# Patient Record
Sex: MALE | Race: WHITE | NOT HISPANIC OR LATINO | Employment: OTHER | URBAN - METROPOLITAN AREA
[De-identification: names, ages, dates, MRNs, and addresses within clinical notes are randomized per-mention and may not be internally consistent; named-entity substitution may affect disease eponyms.]

---

## 2020-07-09 ENCOUNTER — SOCIAL WORK (OUTPATIENT)
Dept: BEHAVIORAL/MENTAL HEALTH CLINIC | Facility: CLINIC | Age: 70
End: 2020-07-09
Payer: MEDICARE

## 2020-07-09 DIAGNOSIS — F33.1 MAJOR DEPRESSIVE DISORDER, RECURRENT EPISODE, MODERATE (HCC): Primary | Chronic | ICD-10-CM

## 2020-07-09 PROBLEM — F45.8 ANXIETY HYPERVENTILATION: Chronic | Status: ACTIVE | Noted: 2020-07-09

## 2020-07-09 PROBLEM — F41.9 ANXIETY HYPERVENTILATION: Chronic | Status: ACTIVE | Noted: 2020-07-09

## 2020-07-09 PROCEDURE — 90834 PSYTX W PT 45 MINUTES: CPT | Performed by: SOCIAL WORKER

## 2020-07-09 NOTE — BH TREATMENT PLAN
Almanoe Buchanan  1950       Date of Initial Treatment Plan: 7/9/20   Date of Current Treatment Plan: 07/09/20    Treatment Plan Number 1     Strengths/Personal Resources for Self Care: Good at his job, supportive family, and good health for his age     Diagnosis:   1  Major depressive disorder, recurrent episode, moderate (HCC)         Area of Needs: halfway fears, phase of life work       Long Term Goal 1: Feel good about his retiring years approaching     Target Date: 6 months   Completion Date:  Pending          Short Term Objectives for Goal 1: Will report playing golf more  Talk to wife ongoing about to retire well, Will report attending [employer] again and maybe attending some type of coffee / breakfast group  GOAL 1: Modality: Individual 4x per month   Completion Date 6 months       Behavioral Health Treatment Plan [facility]: Diagnosis and Treatment Plan explained to Jonathan Felder relates understanding diagnosis and is agreeable to Treatment Plan         Client Comments : Please share your thoughts, feelings, need and/or experiences regarding your treatment plan: Treatment Plan done but not signed at time of office visit due to:  Plan reviewed by phone or in person  and verbal consent given due to [place] social distancing

## 2020-07-09 NOTE — PSYCH
Progress Note     Subjective  Client reports he still has some floating anxieties on retiring  He discussed how he had been trying still to be more active but pulled a muscle setting him back and feeling disappointed with aging   He has been meeting goal to work on ideas with wife on how to retire but feels covid is slowing down progress as moving down [address] [place] is one idea but fearful over spikes         Objective   Client reports with no suicidal or homicidal ideations , comments clear, content organized and goal oriented   , affect of congruent to content , mood is depressed on topics     Assessment  Client clinical presentation is depression much related to phase of life changes    Benefited from CBT modal     Plan   Client will reporting doing a [employer]   building and other objectives  in todays goal plan

## 2020-08-04 ENCOUNTER — TELEMEDICINE (OUTPATIENT)
Dept: BEHAVIORAL/MENTAL HEALTH CLINIC | Facility: CLINIC | Age: 70
End: 2020-08-04
Payer: MEDICARE

## 2020-08-04 DIAGNOSIS — F45.8 ANXIETY HYPERVENTILATION: Primary | Chronic | ICD-10-CM

## 2020-08-04 DIAGNOSIS — F41.9 ANXIETY HYPERVENTILATION: Primary | Chronic | ICD-10-CM

## 2020-08-04 PROCEDURE — 90834 PSYTX W PT 45 MINUTES: CPT | Performed by: SOCIAL WORKER

## 2020-08-04 NOTE — PSYCH
Progress Note     Subjective     Client reports during session he was had increase in anxiety due to a recent trip to ER after chest pain  It was found he had some minor heart valve blockage in one small artery  He was put on Plavix he said and doctors think that will be sufficient for his health  They think he might have had one minor heart attack some time in past based on the testing they did  This had made him worry yet again more about his mortality coming up on him as he ages  We processed his fears and how he wants to react to this ? He had bee fearing to be active after his check up, withdrawing from activity       Objective     No suicide or homicidal ideations , comments are clear, has spiritual / cultural support, affect congruent, content is on fear of dying before he is ready  , mood is anxious and depressed      Assessment     Presents with clinical issues of fear, mortality, control compromised     Benefited from CBT informed modal     Plan    Client will report following doctors suggestions more to get back to his active routine, golf, walking, and traveling to see kids

## 2020-08-14 ENCOUNTER — SOCIAL WORK (OUTPATIENT)
Dept: BEHAVIORAL/MENTAL HEALTH CLINIC | Facility: CLINIC | Age: 70
End: 2020-08-14
Payer: MEDICARE

## 2020-08-14 DIAGNOSIS — F33.1 MAJOR DEPRESSIVE DISORDER, RECURRENT EPISODE, MODERATE (HCC): Chronic | ICD-10-CM

## 2020-08-14 DIAGNOSIS — F41.9 ANXIETY HYPERVENTILATION: Primary | Chronic | ICD-10-CM

## 2020-08-14 DIAGNOSIS — F45.8 ANXIETY HYPERVENTILATION: Primary | Chronic | ICD-10-CM

## 2020-08-14 PROCEDURE — 90834 PSYTX W PT 45 MINUTES: CPT | Performed by: SOCIAL WORKER

## 2020-08-14 NOTE — PSYCH
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

## 2020-08-22 ENCOUNTER — TELEMEDICINE (OUTPATIENT)
Dept: PSYCHIATRY | Facility: CLINIC | Age: 70
End: 2020-08-22
Payer: MEDICARE

## 2020-08-22 DIAGNOSIS — F33.1 MAJOR DEPRESSIVE DISORDER, RECURRENT EPISODE, MODERATE (HCC): Primary | Chronic | ICD-10-CM

## 2020-08-22 DIAGNOSIS — F45.8 ANXIETY HYPERVENTILATION: Chronic | ICD-10-CM

## 2020-08-22 DIAGNOSIS — F41.9 ANXIETY HYPERVENTILATION: Chronic | ICD-10-CM

## 2020-08-22 PROCEDURE — 99214 OFFICE O/P EST MOD 30 MIN: CPT | Performed by: NURSE PRACTITIONER

## 2020-08-22 RX ORDER — CLONAZEPAM 0.5 MG/1
0.5 TABLET ORAL 2 TIMES DAILY
Qty: 30 TABLET | Refills: 0 | Status: SHIPPED | OUTPATIENT
Start: 2020-08-22 | End: 2020-10-05 | Stop reason: SDUPTHER

## 2020-08-22 RX ORDER — ESCITALOPRAM OXALATE 10 MG/1
10 TABLET ORAL DAILY
Qty: 90 TABLET | Refills: 0 | Status: SHIPPED | OUTPATIENT
Start: 2020-08-22 | End: 2020-11-18 | Stop reason: SDUPTHER

## 2020-08-22 NOTE — BH TREATMENT PLAN
TREATMENT PLAN (Medication Management Only)        [facility]    Name and Date of Birth:  Francisco Newsome 65 y o  1950  Date of Treatment Plan: August 22, 2020  Diagnosis/Diagnoses:    1  Major depressive disorder, recurrent episode, moderate ([address] )    2  Anxiety hyperventilation      Strengths/Personal Resources for Self-Care: supportive family, taking medications as prescribed, ability to communicate needs, average or above intelligence  Area/Areas of need (in own words): anxiety symptoms  1  Long Term Goal: improve control of anxiety  Target Date: 2 months - 10/22/2020  Person/Persons responsible for completion of goal: Vivien Dsouza therapist, provider  2  Short Term Objective (s) - How will we reach this goal?:   A  Provider new recommended medication/dosage changes and/or continue medication(s): continue current medications as prescribed  B  continue therapy  C  grounding techniques  Target Date: 6 months - 2/22/2021  Person/Persons Responsible for Completion of Goal: Vivien Dsouza therapist, provider  Progress Towards Goals: continuing treatment  Treatment Modality: medication management every 3 months  Review due 90 to 120 days from date of this plan: 2/22/2020  Expected length of service: ongoing treatment  My Physician/PA/NP and I have developed this plan together and I agree to work on the goals and objectives  I understand the treatment goals that were developed for my treatment  Verified procedural consent signed and complete H&P in chart.

## 2020-08-22 NOTE — PSYCH
Virtual Regular Visit    Problem List Items Addressed This Visit        Other    Major depressive disorder, recurrent episode, moderate (HCC) - Primary (Chronic)    Anxiety hyperventilation (Chronic)        Reason for visit is   Chief Complaint   Patient presents with    Medication Management    Anxiety     Encounter provider Mehreen Fitzpatrick, PhD    Provider located at [address] [place] [address]  [address]  [place] [address]  [phone]    Recent Visits  No visits were found meeting these conditions  Showing recent visits within past 7 days and meeting all other requirements     Today's Visits  Date Type Provider Dept   08/22/20 Telemedicine Mehreen Fitzpatrick, PhD [facility] [place]   Showing today's visits and meeting all other requirements     Future Appointments  No visits were found meeting these conditions  Showing future appointments within next 150 days and meeting all other requirements        After connecting through [employer], the patient was identified by name and date of birth  Celestino Knowles was informed that this is a telemedicine visit and that the visit is being conducted through [employer] and patient was informed that this is not a secure, HIPAA-complaint platform  He agrees to proceed  which may not be secure and therefore, might not be HIPAA-compliant  My office door was closed  No one else was in the room  He acknowledged consent and understanding of privacy and security of the video platform  The patient has agreed to participate and understands they can discontinue the visit at any time  SUBJECTIVE:    Celestino Knowles is a 65 y o  male with a history of anxiety and depression seen for medication management  He reports minimal anxiety, denies depression  Appetite and sleep are good  Takes medication as prescribed  Family are supportive  Denies SE   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 and is coping well  HPI ROS Appetite Changes and Sleep: normal appetite and normal energy level    Review Of Systems:     Mood Normal   Behavior Normal    Thought Content Normal   General Normal    Personality Normal   Other Psych Symptoms Normal   Constitutional Normal   ENT Normal   Cardiovascular Normal    Respiratory Normal    Gastrointestinal As Noted in HPI   Genitourinary As Noted in HPI   Musculoskeletal As Noted in HPI   Integumentary As Noted in HPI   Neurological As Noted in HPI   Endocrine Normal    Other Symptoms Normal        Substance Abuse History:    Social History     Substance and Sexual Activity   Drug Use Not on file       Family Psychiatric History:     No family history on file      Social History     Socioeconomic History    Marital status: /Civil Union     Spouse name: Not on file    Number of children: Not on file    Years of education: Not on file    Highest education level: Not on file   Occupational History    Not on file   Social Needs    Financial resource strain: Not on file    Food insecurity     Worry: Not on file     Inability: Not on file    Transportation needs     Medical: Not on file     Non-medical: Not on file   Tobacco Use    Smoking status: Not on file   Substance and Sexual Activity    Alcohol use: Not on file    Drug use: Not on file    Sexual activity: Not on file   Lifestyle    Physical activity     Days per week: Not on file     Minutes per session: Not on file    Stress: Not on file   Relationships    Social connections     Talks on phone: Not on file     Gets together: Not on file     Attends [employer] service: Not on file     Active member of club or organization: Not on file     Attends meetings of clubs or organizations: Not on file     Relationship status: Not on file    Intimate partner violence     Fear of current or ex partner: Not on file     Emotionally abused: Not on file     Physically abused: Not on file     Forced sexual activity: Not on file   Other Topics Concern    Not on file   Social History Narrative    Not on file       No past medical history on file  No past surgical history on file  No current outpatient medications on file  No current facility-administered medications for this visit  Allergies not on file    reviewed medications    OBJECTIVE:     Mental Status Examination:    Appearance calm and cooperative    Mood euthymic   Affect affect appropriate    Speech a normal rate   Thought Processes normal thought processes   Hallucinations no hallucinations present    Thought Content no delusions   Abnormal Thoughts no suicidal thoughts  and no homicidal thoughts    Orientation  oriented to person and oriented to place   Remote Memory short term memory intact and long term memory intact   Attention Span concentration intact   Intellect Appears to be of Average Intelligence   Insight Insight intact   Judgement judgment was intact   Muscle Strength Normal gait    Language no difficulty naming common objects   Fund of Knowledge displays adequate knowledge of current events   Pain has back pain mild to mod   Pain Scale 5       Laboratory Results: No results found for this or any previous visit  Assessment/Plan:       Diagnoses and all orders for this visit:    Major depressive disorder, recurrent episode, moderate (HCC)    Anxiety hyperventilation          Treatment Recommendations- Risks Benefits      Immediate Medical/Psychiatric/Psychotherapy Treatments and Any Precautions: Support provided, medication management      Controlled Medication Discussion: Discussed with patient the risks of sedation, respiratory depression, impairment of ability to drive and potential for abuse and addiction related to treatment with benzodiazepine medications  The patient understands risk of treatment with benzodiazepine medications, agrees to not drive if feels impaired and agrees to take medications as prescribed         Goals discussed in session: stable mood    Support and medication management: 30 min    Treatment Plan:    Completed and signed during the session: Yes - Treatment Plan done but not signed at time of office visit due to:  Plan reviewed by video and verbal consent given due to [address] distancing    I spent 30 minutes with the patient during this visit      Bro Azul, PhD 08/22/20

## 2020-09-11 ENCOUNTER — TELEMEDICINE (OUTPATIENT)
Dept: BEHAVIORAL/MENTAL HEALTH CLINIC | Facility: CLINIC | Age: 70
End: 2020-09-11
Payer: MEDICARE

## 2020-09-11 DIAGNOSIS — F41.9 ANXIETY HYPERVENTILATION: Primary | Chronic | ICD-10-CM

## 2020-09-11 DIAGNOSIS — F45.8 ANXIETY HYPERVENTILATION: Primary | Chronic | ICD-10-CM

## 2020-09-11 PROCEDURE — 90834 PSYTX W PT 45 MINUTES: CPT | Performed by: SOCIAL WORKER

## 2020-09-11 NOTE — PSYCH
Time : 3pm-3:45pm, 45 minutes   Session Type :  Micro Teams     Subjective     Client reports that he is doing better developing some form of a plan towards end of his work days into [facility]  It is not fully together yet as in not having a specific [facility] date   He still is balking on that one  He is going to take a month off in [address] [place] [address] to travel down to [place] for [employer]  They he will look to when he will leave work ?  He does say he is being more present oriented and anxiety is coming down some     Objective     No Suicide or Homicide Ideation, has cultural and spiritual support , affect is congruent , content is more solution oriented  , Mood is less depressed     Assessment     Client presents with clinical issue of phase of life adjustments , Benefited from CBT informed modal     Plan     Client will report start doing more daily exercising as he has let that go for a while

## 2020-09-25 ENCOUNTER — TELEMEDICINE (OUTPATIENT)
Dept: BEHAVIORAL/MENTAL HEALTH CLINIC | Facility: CLINIC | Age: 70
End: 2020-09-25
Payer: MEDICARE

## 2020-09-25 DIAGNOSIS — F33.1 MAJOR DEPRESSIVE DISORDER, RECURRENT EPISODE, MODERATE (HCC): Primary | Chronic | ICD-10-CM

## 2020-09-25 PROCEDURE — 90834 PSYTX W PT 45 MINUTES: CPT | Performed by: SOCIAL WORKER

## 2020-09-25 NOTE — PSYCH
Time : 3pm-3:45pm, 45 minutes   Session Type : Teams     Subjective     Client reports that he is a bit more down and anxious lately after feeling upset over several high blood pressure spikes post exercising  He went to hospital to be checked worrying if it was cardiac issues again  Says his doctors on not overly concerned that it was a cardiac event  Feels he needs to slowly exercise to allow heart strengthing and not spike up his b/p   It has him feeling bad about his age and growing older again which has been an issue  Objective     No Suicide or Homicide Ideation, has cultural and spiritual support , affect is congruent , content is more health worries  , Mood is depressed again      Assessment     Client presents with clinical issue of worrying about mortality   , Benefited from CBT informed modal     Plan     Client will report follow his doctors advice and be active but not over exert for now

## 2020-10-05 DIAGNOSIS — F45.8 ANXIETY HYPERVENTILATION: Chronic | ICD-10-CM

## 2020-10-05 DIAGNOSIS — F41.9 ANXIETY HYPERVENTILATION: Chronic | ICD-10-CM

## 2020-10-05 RX ORDER — CLONAZEPAM 0.5 MG/1
0.5 TABLET ORAL 2 TIMES DAILY PRN
Qty: 60 TABLET | Refills: 0 | Status: SHIPPED | OUTPATIENT
Start: 2020-10-05 | End: 2020-12-21 | Stop reason: SDUPTHER

## 2020-10-09 ENCOUNTER — TELEMEDICINE (OUTPATIENT)
Dept: BEHAVIORAL/MENTAL HEALTH CLINIC | Facility: CLINIC | Age: 70
End: 2020-10-09
Payer: MEDICARE

## 2020-10-09 DIAGNOSIS — F41.9 ANXIETY HYPERVENTILATION: Chronic | ICD-10-CM

## 2020-10-09 DIAGNOSIS — F45.8 ANXIETY HYPERVENTILATION: Chronic | ICD-10-CM

## 2020-10-09 DIAGNOSIS — F33.1 MAJOR DEPRESSIVE DISORDER, RECURRENT EPISODE, MODERATE (HCC): Primary | Chronic | ICD-10-CM

## 2020-10-09 PROCEDURE — 90834 PSYTX W PT 45 MINUTES: CPT | Performed by: SOCIAL WORKER

## 2020-10-23 ENCOUNTER — TELEMEDICINE (OUTPATIENT)
Dept: BEHAVIORAL/MENTAL HEALTH CLINIC | Facility: CLINIC | Age: 70
End: 2020-10-23
Payer: MEDICARE

## 2020-10-23 DIAGNOSIS — F33.1 MAJOR DEPRESSIVE DISORDER, RECURRENT EPISODE, MODERATE (HCC): Primary | Chronic | ICD-10-CM

## 2020-10-23 PROCEDURE — 90834 PSYTX W PT 45 MINUTES: CPT | Performed by: SOCIAL WORKER

## 2020-11-05 ENCOUNTER — TELEMEDICINE (OUTPATIENT)
Dept: BEHAVIORAL/MENTAL HEALTH CLINIC | Facility: CLINIC | Age: 70
End: 2020-11-05
Payer: MEDICARE

## 2020-11-05 DIAGNOSIS — F41.9 ANXIETY HYPERVENTILATION: Primary | Chronic | ICD-10-CM

## 2020-11-05 DIAGNOSIS — F45.8 ANXIETY HYPERVENTILATION: Primary | Chronic | ICD-10-CM

## 2020-11-05 DIAGNOSIS — F33.1 MAJOR DEPRESSIVE DISORDER, RECURRENT EPISODE, MODERATE (HCC): Chronic | ICD-10-CM

## 2020-11-05 PROCEDURE — 90834 PSYTX W PT 45 MINUTES: CPT | Performed by: SOCIAL WORKER

## 2020-11-18 DIAGNOSIS — F33.1 MAJOR DEPRESSIVE DISORDER, RECURRENT EPISODE, MODERATE (HCC): Chronic | ICD-10-CM

## 2020-11-18 RX ORDER — ESCITALOPRAM OXALATE 10 MG/1
10 TABLET ORAL DAILY
Qty: 30 TABLET | Refills: 0 | Status: SHIPPED | OUTPATIENT
Start: 2020-11-18 | End: 2020-12-21 | Stop reason: SDUPTHER

## 2020-11-19 ENCOUNTER — TELEMEDICINE (OUTPATIENT)
Dept: BEHAVIORAL/MENTAL HEALTH CLINIC | Facility: CLINIC | Age: 70
End: 2020-11-19
Payer: MEDICARE

## 2020-11-19 DIAGNOSIS — F33.1 MAJOR DEPRESSIVE DISORDER, RECURRENT EPISODE, MODERATE (HCC): Primary | Chronic | ICD-10-CM

## 2020-11-19 DIAGNOSIS — F45.8 ANXIETY HYPERVENTILATION: Chronic | ICD-10-CM

## 2020-11-19 DIAGNOSIS — F41.9 ANXIETY HYPERVENTILATION: Chronic | ICD-10-CM

## 2020-11-19 PROCEDURE — 90834 PSYTX W PT 45 MINUTES: CPT | Performed by: SOCIAL WORKER

## 2020-12-08 ENCOUNTER — TELEMEDICINE (OUTPATIENT)
Dept: BEHAVIORAL/MENTAL HEALTH CLINIC | Facility: CLINIC | Age: 70
End: 2020-12-08
Payer: MEDICARE

## 2020-12-08 DIAGNOSIS — F45.8 ANXIETY HYPERVENTILATION: Primary | Chronic | ICD-10-CM

## 2020-12-08 DIAGNOSIS — F41.9 ANXIETY HYPERVENTILATION: Primary | Chronic | ICD-10-CM

## 2020-12-08 PROCEDURE — 90834 PSYTX W PT 45 MINUTES: CPT | Performed by: SOCIAL WORKER

## 2020-12-17 ENCOUNTER — SOCIAL WORK (OUTPATIENT)
Dept: BEHAVIORAL/MENTAL HEALTH CLINIC | Facility: CLINIC | Age: 70
End: 2020-12-17
Payer: MEDICARE

## 2020-12-17 DIAGNOSIS — F33.1 MAJOR DEPRESSIVE DISORDER, RECURRENT EPISODE, MODERATE (HCC): Primary | Chronic | ICD-10-CM

## 2020-12-17 PROCEDURE — 90834 PSYTX W PT 45 MINUTES: CPT | Performed by: SOCIAL WORKER

## 2020-12-21 DIAGNOSIS — F33.1 MAJOR DEPRESSIVE DISORDER, RECURRENT EPISODE, MODERATE (HCC): Chronic | ICD-10-CM

## 2020-12-21 DIAGNOSIS — F41.9 ANXIETY HYPERVENTILATION: Chronic | ICD-10-CM

## 2020-12-21 DIAGNOSIS — F45.8 ANXIETY HYPERVENTILATION: Chronic | ICD-10-CM

## 2020-12-21 RX ORDER — ESCITALOPRAM OXALATE 10 MG/1
10 TABLET ORAL DAILY
Qty: 90 TABLET | Refills: 0 | Status: SHIPPED | OUTPATIENT
Start: 2020-12-21 | End: 2021-03-22 | Stop reason: SDUPTHER

## 2020-12-21 RX ORDER — CLONAZEPAM 0.5 MG/1
0.5 TABLET ORAL 2 TIMES DAILY PRN
Qty: 60 TABLET | Refills: 0 | Status: SHIPPED | OUTPATIENT
Start: 2020-12-21 | End: 2021-06-08 | Stop reason: SDUPTHER

## 2020-12-30 ENCOUNTER — TELEMEDICINE (OUTPATIENT)
Dept: PSYCHIATRY | Facility: CLINIC | Age: 70
End: 2020-12-30
Payer: MEDICARE

## 2020-12-30 DIAGNOSIS — F33.1 MAJOR DEPRESSIVE DISORDER, RECURRENT EPISODE, MODERATE (HCC): Primary | Chronic | ICD-10-CM

## 2020-12-30 DIAGNOSIS — F45.8 ANXIETY HYPERVENTILATION: Chronic | ICD-10-CM

## 2020-12-30 DIAGNOSIS — F41.9 ANXIETY HYPERVENTILATION: Chronic | ICD-10-CM

## 2020-12-30 PROCEDURE — 99214 OFFICE O/P EST MOD 30 MIN: CPT | Performed by: NURSE PRACTITIONER

## 2021-01-12 ENCOUNTER — DOCUMENTATION (OUTPATIENT)
Dept: BEHAVIORAL/MENTAL HEALTH CLINIC | Facility: CLINIC | Age: 71
End: 2021-01-12

## 2021-01-12 DIAGNOSIS — F33.1 MAJOR DEPRESSIVE DISORDER, RECURRENT EPISODE, MODERATE (HCC): Primary | Chronic | ICD-10-CM

## 2021-01-12 NOTE — PROGRESS NOTES
Assessment/Plan: Client was struggling with phase of life issue to retire  He also was fearful of covid   He met goal to plan assisted , reduce down to p/t work for now and planned d/c of treatment      Diagnoses and all orders for this visit:    Major depressive disorder, recurrent episode, moderate ([facility] )          Subjective:N/A     Patient ID: Harpreet Euceda is a 65 y o  male  Outpatient Discharge Summary:   Admission Date: 7/9/2020  Kai Bazzi was referred by Self   Discharge Date: 1/12/2021    Discharge Diagnosis:    1   Major depressive disorder, recurrent episode, moderate ([facility] )         Treating Physician: See EHR  Treatment Complications: None   Presenting Problem: Anxiety   Course of treatment includes:    individual therapy   Treatment Progress: Good   Criteria for Discharge: completed treatment goals and objectives and is no longer in need of services  Aftercare recommendations include Return in future if needed   Discharge Medications include:  Current Outpatient Medications:     clonazePAM (KlonoPIN) 0 5 mg tablet, Take 1 tablet (0 5 mg total) by mouth 2 (two) times a day as needed for anxiety, Disp: 60 tablet, Rfl: 0    escitalopram (LEXAPRO) 10 mg tablet, Take 1 tablet (10 mg total) by mouth daily, Disp: 90 tablet, Rfl: 0    Prognosis: good

## 2021-03-01 DIAGNOSIS — Z23 ENCOUNTER FOR IMMUNIZATION: ICD-10-CM

## 2021-03-22 DIAGNOSIS — F33.1 MAJOR DEPRESSIVE DISORDER, RECURRENT EPISODE, MODERATE (HCC): Chronic | ICD-10-CM

## 2021-03-22 RX ORDER — ESCITALOPRAM OXALATE 10 MG/1
10 TABLET ORAL DAILY
Qty: 90 TABLET | Refills: 0 | Status: SHIPPED | OUTPATIENT
Start: 2021-03-22 | End: 2021-06-08 | Stop reason: SDUPTHER

## 2021-04-28 ENCOUNTER — TELEMEDICINE (OUTPATIENT)
Dept: PSYCHIATRY | Facility: CLINIC | Age: 71
End: 2021-04-28
Payer: MEDICARE

## 2021-04-28 DIAGNOSIS — F41.9 ANXIETY HYPERVENTILATION: Chronic | ICD-10-CM

## 2021-04-28 DIAGNOSIS — F45.8 ANXIETY HYPERVENTILATION: Chronic | ICD-10-CM

## 2021-04-28 DIAGNOSIS — F41.1 GENERALIZED ANXIETY DISORDER: ICD-10-CM

## 2021-04-28 DIAGNOSIS — F33.1 MAJOR DEPRESSIVE DISORDER, RECURRENT EPISODE, MODERATE (HCC): Primary | Chronic | ICD-10-CM

## 2021-04-28 PROCEDURE — 90833 PSYTX W PT W E/M 30 MIN: CPT | Performed by: NURSE PRACTITIONER

## 2021-04-28 PROCEDURE — 99214 OFFICE O/P EST MOD 30 MIN: CPT | Performed by: NURSE PRACTITIONER

## 2021-04-30 NOTE — PSYCH
Virtual Regular Visit    Problem List Items Addressed This Visit        Other    Major depressive disorder, recurrent episode, moderate (HCC) - Primary (Chronic)    Generalized anxiety disorder        Reason for visit is   Chief Complaint   Patient presents with   [address] Depression    Medication Management     Encounter provider Jackie Cline, PhD    Provider located at [address] [place] [address]  [address]  [facility]  [phone]    Recent Visits  Date Type Provider Dept   04/28/21 Telemedicine Jackie Cline, PhD [facility] [place]   Showing recent visits within past 7 days and meeting all other requirements     Future Appointments  No visits were found meeting these conditions  Showing future appointments within next 150 days and meeting all other requirements        After connecting through [place], the patient was identified by name and date of birth  Deuce Tiwari was informed that this is a telemedicine visit and that the visit is being conducted through [employer] and patient was informed that this is a secure, HIPAA-compliant platform  He agrees to proceed  My office door was closed  No one else was in the room  He acknowledged consent and understanding of privacy and security of the video platform  The patient has agreed to participate and understands they can discontinue the visit at any time  SUBJECTIVE:    Deuce Tiwari is a 65 y o  male with a history of anxiety and depression seen for medication management and mood evaluation  Rylee Booker reports he feels down sometimes due to physical condition  Covid is difficult and he is finding ways to cope with "covid life " Appetite and sleep are good  Takes medication as prescribed  Family are supportive      HPI ROS Appetite Changes and Sleep: normal appetite and normal energy level    Review Of Systems:     Mood Anxiety and Depression   Behavior Normal    Thought Content Normal   General Normal    Personality Normal   Other Psych Symptoms Normal   Constitutional As Noted in HPI   ENT As Noted in HPI   Cardiovascular As Noted in HPI   Respiratory As Noted in HPI   Gastrointestinal As Noted in HPI   Genitourinary As Noted in HPI   Musculoskeletal As Noted in HPI   Integumentary As Noted in HPI   Neurological As Noted in HPI   Endocrine Normal    Other Symptoms Normal        Substance Abuse History:    Social History     Substance and Sexual Activity   Drug Use Not on file       Family Psychiatric History:     History reviewed  No pertinent family history      Social History     Socioeconomic History    Marital status: /Civil Union     Spouse name: Not on file    Number of children: Not on file    Years of education: Not on file    Highest education level: Not on file   Occupational History    Not on file   Social Needs    Financial resource strain: Not on file    Food insecurity     Worry: Not on file     Inability: Not on file    Transportation needs     Medical: Not on file     Non-medical: Not on file   Tobacco Use    Smoking status: Not on file   Substance and Sexual Activity    Alcohol use: Not on file    Drug use: Not on file    Sexual activity: Not on file   Lifestyle    Physical activity     Days per week: Not on file     Minutes per session: Not on file    Stress: Not on file   Relationships    Social connections     Talks on phone: Not on file     Gets together: Not on file     Attends [employer] service: Not on file     Active member of club or organization: Not on file     Attends meetings of clubs or organizations: Not on file     Relationship status: Not on file    Intimate partner violence     Fear of current or ex partner: Not on file     Emotionally abused: Not on file     Physically abused: Not on file     Forced sexual activity: Not on file   Other Topics Concern    Not on file   Social History Narrative    Not on file       History reviewed  No pertinent past medical history  No past surgical history on file  Current Outpatient Medications   Medication Sig Dispense Refill    clonazePAM (KlonoPIN) 0 5 mg tablet Take 1 tablet (0 5 mg total) by mouth 2 (two) times a day as needed for anxiety 60 tablet 0    escitalopram (LEXAPRO) 10 mg tablet Take 1 tablet (10 mg total) by mouth daily 90 tablet 0     No current facility-administered medications for this visit  Not on File    The following portions of the patient's history were reviewed and updated as appropriate: allergies, current medications, past family history, past medical history, past social history, past surgical history and problem list     OBJECTIVE:     Mental Status Examination:    Appearance calm and cooperative , adequate hygiene and grooming and good eye contact    Mood depressed and anxious   Affect affect appropriate    Speech a normal rate   Thought Processes normal thought processes   Hallucinations no hallucinations present    Thought Content no delusions   Abnormal Thoughts no suicidal thoughts  and no homicidal thoughts    Orientation  oriented to person and place and time   Remote Memory short term memory intact and long term memory intact   Attention Span concentration intact   Intellect Appears to be of Average Intelligence   Insight Insight intact   Judgement judgment was intact   Muscle Strength denies problems   Language no difficulty naming common objects   Fund of Knowledge displays adequate knowledge of current events   Pain none   Pain Scale 0       Laboratory Results: No results found for this or any previous visit      Assessment/Plan:       Diagnoses and all orders for this visit:    Major depressive disorder, recurrent episode, moderate (HCC)    Generalized anxiety disorder          Treatment Recommendations- Risks Benefits      Immediate Medical/Psychiatric/Psychotherapy Treatments and Any Precautions: Reviewed medication, continue treatment plan    Risks, Benefits And Possible Side Effects Of Medications:  {PSYCH RISK, BENEFITS AND POSSIBLE SIDE EFFECTS (Optional):08693       Psychotherapy Provided: 18 min  Supportive therapy  Coping with Covid life  behavioral assessment    Goals discussed in session: decrease anxiety and depression       Treatment Plan:    Completed and signed during the session: Not applicable - Treatment Plan not due at this session enacted 8/22/2020, updated 12/30/2020        Mayelin Carrera, PhD 04/30/21

## 2021-06-08 DIAGNOSIS — F45.8 ANXIETY HYPERVENTILATION: Chronic | ICD-10-CM

## 2021-06-08 DIAGNOSIS — F41.9 ANXIETY HYPERVENTILATION: Chronic | ICD-10-CM

## 2021-06-08 DIAGNOSIS — F33.1 MAJOR DEPRESSIVE DISORDER, RECURRENT EPISODE, MODERATE (HCC): Chronic | ICD-10-CM

## 2021-06-08 RX ORDER — CLONAZEPAM 0.5 MG/1
0.5 TABLET ORAL 2 TIMES DAILY PRN
Qty: 60 TABLET | Refills: 0 | Status: SHIPPED | OUTPATIENT
Start: 2021-06-08 | End: 2021-12-06 | Stop reason: SDUPTHER

## 2021-06-08 RX ORDER — ESCITALOPRAM OXALATE 10 MG/1
10 TABLET ORAL DAILY
Qty: 90 TABLET | Refills: 0 | Status: SHIPPED | OUTPATIENT
Start: 2021-06-08 | End: 2021-06-14

## 2021-06-14 DIAGNOSIS — F33.1 MAJOR DEPRESSIVE DISORDER, RECURRENT EPISODE, MODERATE (HCC): Chronic | ICD-10-CM

## 2021-06-14 RX ORDER — ESCITALOPRAM OXALATE 10 MG/1
TABLET ORAL
Qty: 90 TABLET | Refills: 0 | Status: SHIPPED | OUTPATIENT
Start: 2021-06-14 | End: 2021-09-15 | Stop reason: SDUPTHER

## 2021-09-15 DIAGNOSIS — F33.1 MAJOR DEPRESSIVE DISORDER, RECURRENT EPISODE, MODERATE (HCC): Chronic | ICD-10-CM

## 2021-09-15 RX ORDER — ESCITALOPRAM OXALATE 10 MG/1
10 TABLET ORAL DAILY
Qty: 90 TABLET | Refills: 0 | Status: SHIPPED | OUTPATIENT
Start: 2021-09-15 | End: 2021-09-17 | Stop reason: SDUPTHER

## 2021-09-17 DIAGNOSIS — F33.1 MAJOR DEPRESSIVE DISORDER, RECURRENT EPISODE, MODERATE (HCC): Chronic | ICD-10-CM

## 2021-09-17 RX ORDER — ESCITALOPRAM OXALATE 10 MG/1
10 TABLET ORAL DAILY
Qty: 90 TABLET | Refills: 0 | Status: SHIPPED | OUTPATIENT
Start: 2021-09-17 | End: 2021-12-06 | Stop reason: SDUPTHER

## 2021-12-06 ENCOUNTER — TELEMEDICINE (OUTPATIENT)
Dept: PSYCHIATRY | Facility: CLINIC | Age: 71
End: 2021-12-06
Payer: MEDICARE

## 2021-12-06 VITALS — WEIGHT: 190 LBS

## 2021-12-06 DIAGNOSIS — F41.9 ANXIETY HYPERVENTILATION: Chronic | ICD-10-CM

## 2021-12-06 DIAGNOSIS — F45.8 ANXIETY HYPERVENTILATION: Chronic | ICD-10-CM

## 2021-12-06 DIAGNOSIS — F33.1 MAJOR DEPRESSIVE DISORDER, RECURRENT EPISODE, MODERATE (HCC): Primary | Chronic | ICD-10-CM

## 2021-12-06 DIAGNOSIS — F41.1 GENERALIZED ANXIETY DISORDER: ICD-10-CM

## 2021-12-06 PROCEDURE — 99214 OFFICE O/P EST MOD 30 MIN: CPT | Performed by: NURSE PRACTITIONER

## 2021-12-06 PROCEDURE — 90833 PSYTX W PT W E/M 30 MIN: CPT | Performed by: NURSE PRACTITIONER

## 2021-12-06 RX ORDER — CLONAZEPAM 0.5 MG/1
0.5 TABLET ORAL 2 TIMES DAILY PRN
Qty: 60 TABLET | Refills: 0 | Status: SHIPPED | OUTPATIENT
Start: 2021-12-06

## 2021-12-06 RX ORDER — ESCITALOPRAM OXALATE 10 MG/1
10 TABLET ORAL DAILY
Qty: 90 TABLET | Refills: 0 | Status: SHIPPED | OUTPATIENT
Start: 2021-12-06 | End: 2022-03-11 | Stop reason: SDUPTHER

## 2022-03-11 DIAGNOSIS — F33.1 MAJOR DEPRESSIVE DISORDER, RECURRENT EPISODE, MODERATE (HCC): Chronic | ICD-10-CM

## 2022-03-11 RX ORDER — ESCITALOPRAM OXALATE 10 MG/1
10 TABLET ORAL DAILY
Qty: 90 TABLET | Refills: 0 | Status: SHIPPED | OUTPATIENT
Start: 2022-03-11 | End: 2022-05-18 | Stop reason: SDUPTHER

## 2022-03-11 NOTE — TELEPHONE ENCOUNTER
----- Message from [address] [place] [address] sent at 3/11/2022 11:35 AM EST -----  Regarding: refill  escitalopram (LEXAPRO) 10 mg tablet  Take 1 tablet (10 mg total) by mouth daily,    [address] [place]    5/18/22 SANJU

## 2022-05-18 ENCOUNTER — TELEMEDICINE (OUTPATIENT)
Dept: PSYCHIATRY | Facility: CLINIC | Age: 72
End: 2022-05-18
Payer: MEDICARE

## 2022-05-18 DIAGNOSIS — F45.8 ANXIETY HYPERVENTILATION: Primary | Chronic | ICD-10-CM

## 2022-05-18 DIAGNOSIS — F41.9 ANXIETY HYPERVENTILATION: Primary | Chronic | ICD-10-CM

## 2022-05-18 DIAGNOSIS — F33.1 MAJOR DEPRESSIVE DISORDER, RECURRENT EPISODE, MODERATE (HCC): Chronic | ICD-10-CM

## 2022-05-18 DIAGNOSIS — F41.1 GENERALIZED ANXIETY DISORDER: ICD-10-CM

## 2022-05-18 PROCEDURE — 99214 OFFICE O/P EST MOD 30 MIN: CPT | Performed by: NURSE PRACTITIONER

## 2022-05-18 PROCEDURE — 90833 PSYTX W PT W E/M 30 MIN: CPT | Performed by: NURSE PRACTITIONER

## 2022-05-18 RX ORDER — VALSARTAN 40 MG/1
TABLET ORAL EVERY 24 HOURS
COMMUNITY

## 2022-05-18 RX ORDER — SODIUM POLYSTYRENE SULFONATE 4.1 MEQ/G
POWDER, FOR SUSPENSION ORAL; RECTAL
COMMUNITY
Start: 2022-04-26

## 2022-05-18 RX ORDER — CYCLOSPORINE 0.5 MG/ML
1 EMULSION OPHTHALMIC 2 TIMES DAILY
COMMUNITY

## 2022-05-18 RX ORDER — METOPROLOL SUCCINATE 25 MG/1
25 TABLET, EXTENDED RELEASE ORAL 2 TIMES DAILY
COMMUNITY
Start: 2022-03-02

## 2022-05-18 RX ORDER — TRAMADOL HYDROCHLORIDE 50 MG/1
TABLET ORAL
COMMUNITY
Start: 2022-05-12

## 2022-05-18 RX ORDER — EZETIMIBE 10 MG/1
10 TABLET ORAL DAILY
COMMUNITY

## 2022-05-18 RX ORDER — ESCITALOPRAM OXALATE 10 MG/1
10 TABLET ORAL DAILY
Qty: 90 TABLET | Refills: 0 | Status: SHIPPED | OUTPATIENT
Start: 2022-05-18

## 2022-05-18 RX ORDER — METOPROLOL SUCCINATE 25 MG/1
25 TABLET, EXTENDED RELEASE ORAL 2 TIMES DAILY
COMMUNITY

## 2022-05-18 RX ORDER — FLUTICASONE PROPIONATE 50 MCG
SPRAY, SUSPENSION (ML) NASAL
COMMUNITY
Start: 2022-02-01

## 2022-05-18 RX ORDER — VALSARTAN 80 MG/1
TABLET ORAL
COMMUNITY
Start: 2022-02-28

## 2022-05-18 RX ORDER — BEMPEDOIC ACID AND EZETIMIBE 180; 10 MG/1; MG/1
TABLET, FILM COATED ORAL
COMMUNITY
Start: 2022-05-05

## 2022-05-18 RX ORDER — PITAVASTATIN CALCIUM 4.18 MG/1
TABLET, FILM COATED ORAL
COMMUNITY
Start: 2022-04-15

## 2022-05-18 RX ORDER — CICLOPIROX 1 G/100ML
SHAMPOO TOPICAL
COMMUNITY
Start: 2022-04-13

## 2022-05-18 RX ORDER — KETOCONAZOLE 20 MG/ML
SHAMPOO TOPICAL
COMMUNITY
Start: 2022-04-13

## 2022-05-18 RX ORDER — RANOLAZINE 1000 MG/1
1 TABLET, EXTENDED RELEASE ORAL 2 TIMES DAILY
COMMUNITY
Start: 2022-02-21

## 2022-05-18 NOTE — BH TREATMENT PLAN
TREATMENT PLAN (Medication Management Only)         [facility]     Name and Date of Erica Turner  1950  Date of Treatment Plan: August 22, 2020, December 30, 2020, December 06, 2021, May 18, 2022  Diagnosis/Diagnoses:    1  Major depressive disorder, recurrent episode, moderate ([facility] [facility] )    2  Anxiety hyperventilation       Strengths/Personal Resources for Self-Care: supportive family, taking medications as prescribed, ability to communicate needs, average or above intelligence  Area/Areas of need (in own words): anxiety symptoms  1          Long Term Goal: improve control of anxiety  Target Date: 6 months - 11/18/2022  Person/Persons responsible for completion of goal: Chepe stone, provider  2          Short Term Objective (s) - How will we reach this goal?:   A  Provider new recommended medication/dosage changes and/or continue medication(s): continue current medications as prescribed  B  continue therapy  C  grounding techniques  Target Date:  6 months -  11/18/2022  Person/Persons Responsible for Completion of Goal: Chepe stone, provider  Progress Towards Goals: continuing treatment  Treatment Modality: medication management every 3 months  Review due 180 days from date of this plan:  6 months - 11/18/2022    Expected length of service: ongoing treatment  My Physician/PA/NP and I have developed this plan together and I agree to work on the goals and objectives  I understand the treatment goals that were developed for my treatment

## 2022-05-18 NOTE — PSYCH
Virtual Regular Visit    Problem List Items Addressed This Visit        Other    Anxiety hyperventilation - Primary (Chronic)    Major depressive disorder, recurrent episode, moderate (HCC) (Chronic)    Generalized anxiety disorder        Reason for visit is   Chief Complaint   Patient presents with    Anxiety    Depression    Medication Management     Encounter provider Amanda Rice, PhD    Provider located at [address] [place] [address]  [address]  [place] [address]  [phone]    Recent Visits  No visits were found meeting these conditions  Showing recent visits within past 7 days and meeting all other requirements  Today's Visits  Date Type Provider Dept   05/18/22 Telemedicine Amanda Rice, PhD [facility]   Showing today's visits and meeting all other requirements  Future Appointments  No visits were found meeting these conditions  Showing future appointments within next 150 days and meeting all other requirements       After connecting through [employer], the patient was identified by name and date of birth  Richard Chávez was informed that this is a telemedicine visit and that the visit is being conducted through [address] [place] [address] Now and patient was informed that this is a secure, HIPAA-compliant platform  He agrees to proceed  which may not be secure and therefore, might not be HIPAA-compliant  My office door was closed  No one else was in the room  He acknowledged consent and understanding of privacy and security of the video platform  The patient has agreed to participate and understands they can discontinue the visit at any time  SUBJECTIVE:    Richard Chávez is a 66 y o  male with a history of anxiety and depression seen for medication management and mood assessment  Sabrina Murphy reports his moods are stable  Medication has helped maintain stability  He is eating and sleeps about 6-7 hours a night   He continues to work virtually  He no longer attends therapy and is coping  Sadness is reported from death of friend and his friend's twin sister  However, he has three new grandchildren since January and is very happy  Main complaint is being sore from golfing  Takes medication as prescribed  Family are supportive  HPI ROS Appetite Changes and Sleep: normal appetite and normal energy level    Review Of Systems:     Mood Normal   Behavior Normal    Thought Content Normal   General Normal    Personality Normal   Other Psych Symptoms Normal   Constitutional As Noted in HPI   ENT As Noted in HPI   Cardiovascular As Noted in HPI   Respiratory As Noted in HPI   Gastrointestinal As Noted in HPI   Genitourinary As Noted in HPI   Musculoskeletal As Noted in HPI   Integumentary As Noted in HPI   Neurological As Noted in HPI   Endocrine Normal    Other Symptoms Normal        Substance Abuse History:    Social History     Substance and Sexual Activity   Drug Use Never       Family Psychiatric History:     Family History   Problem Relation Age of Onset    Depression Mother     Post-traumatic stress disorder Son        Social History     Socioeconomic History    Marital status: /Civil Union     Spouse name: Not on file    Number of children: Not on file    Years of education: Not on file    Highest education level: Not on file   Occupational History    Not on file   Tobacco Use    Smoking status: Never Smoker    Smokeless tobacco: Never Used   Vaping Use    Vaping Use: Not on file   Substance and Sexual Activity    Alcohol use:  Yes    Drug use: Never    Sexual activity: Not Currently     Partners: Female   Other Topics Concern    Not on file   Social History Narrative    Not on file     Social Determinants of Health     Financial Resource Strain: Not on file   Food Insecurity: Not on file   Transportation Needs: Not on file   Physical Activity: Not on file   Stress: Not on file   Social Connections: Not on file Intimate Partner Violence: Not on file   Housing Stability: Not on file       Past Medical History:   Diagnosis Date    Anxiety     Depression     Frequent urination     Hypercholesterolemia     Hypertension     Rheumatoid arthritis (HCC)     Rib fracture     Rupture quadriceps tendon     Seasonal allergies     Sinusitis     Sleep difficulties     Tenosynovitis     bilateral stenosing    Umbilical hernia     Wears glasses        Past Surgical History:   Procedure Laterality Date    HAND SURGERY Bilateral     QUADRICEPS REPAIR  2015   [facility]    TONSILLECTOMY  7921    UMBILICAL HERNIA REPAIR  2014       Current Outpatient Medications   Medication Sig Dispense Refill    clonazePAM (KlonoPIN) 0 5 mg tablet Take 1 tablet (0 5 mg total) by mouth 2 (two) times a day as needed for anxiety 60 tablet 0    escitalopram (LEXAPRO) 10 mg tablet Take 1 tablet (10 mg total) by mouth daily 90 tablet 0    Multiple Vitamin (MULTIVITAMIN ADULT PO) Take by mouth       No current facility-administered medications for this visit          Allergies   Allergen Reactions    Pravastatin Other (See Comments)    Atorvastatin Other (See Comments)    Other Other (See Comments)    Pollen Extract Other (See Comments)    Lovastatin Other (See Comments)    Simvastatin Other (See Comments)    Statins Other (See Comments)     Other reaction(s): Liver enzymes abnormal         The following portions of the patient's history were reviewed and updated as appropriate: allergies, current medications, past family history, past medical history, past social history, past surgical history and problem list     OBJECTIVE:     Mental Status Examination:    Appearance calm and cooperative , adequate hygiene and grooming and good eye contact    Mood euthymic   Affect affect appropriate    Speech a normal rate   Thought Processes normal thought processes   Hallucinations no hallucinations present    Thought Content no delusions   Abnormal Thoughts no suicidal thoughts  and no homicidal thoughts    Orientation  oriented to person and place and time   Remote Memory short term memory intact   Attention Span concentration intact   Intellect Appears to be of Average Intelligence   Insight Insight intact   Judgement judgment was intact   Muscle Strength Muscle strength and tone were normal   Language no difficulty naming common objects   Fund of Knowledge displays adequate knowledge of current events   Pain moderate to severe   Pain Scale 3       Laboratory Results: No results found for this or any previous visit      Assessment/Plan:       Diagnoses and all orders for this visit:    Anxiety hyperventilation    Generalized anxiety disorder    Major depressive disorder, recurrent episode, moderate ([facility] [address] )          Treatment Recommendations- Risks Benefits      Immediate Medical/Psychiatric/Psychotherapy Treatments and Any Precautions:  reviewed medication continue with treatment plan, discussed coping with loss of friends    Risks, Benefits And Possible Side Effects Of Medications:  {PSYCH RISK, BENEFITS AND POSSIBLE SIDE EFFECTS (Optional):70726    Controlled Medication Discussion: he is aware of safe use and storage of medication     Psychotherapy Provided: 30 min  Supportive therapy  Medication evaluation  Coping with loss  Mood assessment    Goals discussed in session: maintain stable moods       Treatment Plan:    Completed and signed during the session: Yes - Treatment Plan done but not signed at time of office visit due to:  Plan reviewed by video and verbal consent given due to [address] distancing, enacted 8/22/2020, updated 12/30/2020, 12/06/2021, 5/18/2022        Evette Zamudio, PhD 05/18/22

## 2022-09-15 DIAGNOSIS — F33.1 MAJOR DEPRESSIVE DISORDER, RECURRENT EPISODE, MODERATE (HCC): Chronic | ICD-10-CM

## 2022-09-15 RX ORDER — ESCITALOPRAM OXALATE 10 MG/1
10 TABLET ORAL DAILY
Qty: 90 TABLET | Refills: 0 | Status: SHIPPED | OUTPATIENT
Start: 2022-09-15

## 2022-09-15 NOTE — TELEPHONE ENCOUNTER
escitalopram (LEXAPRO) 10 mg tablet~Take 1 tablet (10 mg total) by mouth in the morning ,     [address] [facility], [address] [facility]    10/17 LE

## 2022-10-17 ENCOUNTER — TELEMEDICINE (OUTPATIENT)
Dept: PSYCHIATRY | Facility: CLINIC | Age: 72
End: 2022-10-17
Payer: MEDICARE

## 2022-10-17 DIAGNOSIS — F33.1 MAJOR DEPRESSIVE DISORDER, RECURRENT EPISODE, MODERATE (HCC): Chronic | ICD-10-CM

## 2022-10-17 DIAGNOSIS — F41.1 GENERALIZED ANXIETY DISORDER: Primary | ICD-10-CM

## 2022-10-17 PROCEDURE — 90833 PSYTX W PT W E/M 30 MIN: CPT | Performed by: NURSE PRACTITIONER

## 2022-10-17 PROCEDURE — 99214 OFFICE O/P EST MOD 30 MIN: CPT | Performed by: NURSE PRACTITIONER

## 2022-10-17 NOTE — PSYCH
Virtual Regular Visit    Problem List Items Addressed This Visit        Other    Major depressive disorder, recurrent episode, moderate (HCC) (Chronic)    Generalized anxiety disorder - Primary        Reason for visit is   Chief Complaint   Patient presents with   • Anxiety   • Depression   • Medication Management     Encounter provider Cynthia Mishra, PhD    Provider located at [address] [place] [address]  [address]  [place] [address]  [phone]    Recent Visits  No visits were found meeting these conditions  Showing recent visits within past 7 days and meeting all other requirements  Today's Visits  Date Type Provider Dept   10/17/22 Telemedicine Cynthia Mishra, PhD [facility]   Showing today's visits and meeting all other requirements  Future Appointments  No visits were found meeting these conditions  Showing future appointments within next 150 days and meeting all other requirements       After connecting through [employer], the patient was identified by name and date of birth  Francisco Newsome was informed that this is a telemedicine visit and that the visit is being conducted through Telephone( Alee Donovan would not connect) which may not be secure and therefore, might not be HIPAA-compliant  My office door was closed  No one else was in the room  He acknowledged consent and understanding of privacy and security of the video platform  The patient has agreed to participate and understands they can discontinue the visit at any time  SUBJECTIVE:    Francisco Newsome is a 74 y o  male with a history of anxiety and depression seen for medication management and mood assessment  Vivien Dsouza reports his moods are stable  Medication has helped maintain stability  He is eating and sleeps about 6-7 hours a night  He went to [facility] with his wife and the [place] two weeks ago  He is active playing golf   Grandchildren bring him happiness  Planning to retire this coming year  Takes medication as prescribed, he is social  Denies depression or anxiety  Has arthritis pain in his back  HPI ROS Appetite Changes and Sleep: normal appetite and normal energy level    Review Of Systems:     Mood Normal   Behavior Normal    Thought Content Normal   General Normal    Personality Normal   Other Psych Symptoms Normal   Constitutional As Noted in HPI   ENT As Noted in HPI   Cardiovascular As Noted in HPI   Respiratory As Noted in HPI   Gastrointestinal As Noted in HPI   Genitourinary As Noted in HPI   Musculoskeletal As Noted in HPI   Integumentary As Noted in HPI   Neurological As Noted in HPI   Endocrine Normal    Other Symptoms Normal        Substance Abuse History:    Social History     Substance and Sexual Activity   Drug Use Never       Family Psychiatric History:     Family History   Problem Relation Age of Onset   • Depression Mother    • Post-traumatic stress disorder Son        Social History     Socioeconomic History   • Marital status: /Civil Union     Spouse name: Not on file   • Number of children: Not on file   • Years of education: Not on file   • Highest education level: Not on file   Occupational History   • Not on file   Tobacco Use   • Smoking status: Never Smoker   • Smokeless tobacco: Never Used   Vaping Use   • Vaping Use: Not on file   Substance and Sexual Activity   • Alcohol use:  Yes   • Drug use: Never   • Sexual activity: Not Currently     Partners: Female   Other Topics Concern   • Not on file   Social History Narrative   • Not on file     Social Determinants of Health     Financial Resource Strain: Not on file   Food Insecurity: Not on file   Transportation Needs: Not on file   Physical Activity: Not on file   Stress: Not on file   Social Connections: Not on file   Intimate Partner Violence: Not on file   Housing Stability: Not on file       Past Medical History:   Diagnosis Date   • Anxiety    • Depression    • Frequent urination    • Hypercholesterolemia    • Hypertension    • Rheumatoid arthritis ([facility] [address] )    • Rib fracture    • Rupture quadriceps tendon    • Seasonal allergies    • Sinusitis    • Sleep difficulties    • Tenosynovitis     bilateral stenosing   • Umbilical hernia    • Wears glasses        Past Surgical History:   Procedure Laterality Date   • HAND SURGERY Bilateral    • QUADRICEPS REPAIR  2015   • SINUS SURGERY  1982   • TONSILLECTOMY  2055   • UMBILICAL HERNIA REPAIR  2014       Current Outpatient Medications   Medication Sig Dispense Refill   • Bempedoic Acid-Ezetimibe 180-10 MG TABS Take 1 tablet by mouth daily     • Ciclopirox 1 % shampoo      • clonazePAM (KlonoPIN) 0 5 mg tablet Take 1 tablet (0 5 mg total) by mouth 2 (two) times a day as needed for anxiety 60 tablet 0   • cycloSPORINE (RESTASIS) 0 05 % ophthalmic emulsion 1 drop  in the morning and 1 drop in the evening  • econazole nitrate 1 % cream      • escitalopram (LEXAPRO) 10 mg tablet Take 1 tablet (10 mg total) by mouth daily 90 tablet 0   • ezetimibe (ZETIA) 10 mg tablet Take 10 mg by mouth in the morning  • fluticasone (FLONASE) 50 mcg/act nasal spray      • ketoconazole (NIZORAL) 2 % shampoo      • Livalo 4 MG TABS      • metoprolol succinate (TOPROL-XL) 25 mg 24 hr tablet Take 25 mg by mouth in the morning and 25 mg in the evening  • metoprolol succinate (TOPROL-XL) 25 mg 24 hr tablet Take 25 mg by mouth in the morning and 25 mg in the evening  • Multiple Vitamin (MULTIVITAMIN ADULT PO) Take by mouth     • Nexlizet 180-10 MG TABS      • ranolazine (RANEXA) 1000 MG SR tablet Take 1 tablet by mouth in the morning and 1 tablet in the evening  • sodium polystyrene (KAYEXALATE) powder      • traMADol (ULTRAM) 50 mg tablet 1 tablet as needed     • valsartan (DIOVAN) 40 mg tablet every 24 hours     • valsartan (DIOVAN) 80 mg tablet        No current facility-administered medications for this visit          Allergies   Allergen Reactions   • Pravastatin Other (See Comments)   • Atorvastatin Other (See Comments)   • Other Other (See Comments)   • Pollen Extract Other (See Comments)   • Lovastatin Other (See Comments)   • Simvastatin Other (See Comments)   • Statins Other (See Comments)     Other reaction(s): Liver enzymes abnormal         The following portions of the patient's history were reviewed and updated as appropriate: allergies, current medications, past family history, past medical history, past social history, past surgical history and problem list     OBJECTIVE:     Mental Status Examination:    Appearance phone   Mood euthymic   Affect phone   Speech a normal rate   Thought Processes normal thought processes   Hallucinations no hallucinations present    Thought Content no delusions   Abnormal Thoughts no suicidal thoughts  and no homicidal thoughts    Orientation  oriented to person and place and time   Remote Memory short term memory intact and long term memory intact   Attention Span concentration intact   Intellect Appears to be of Average Intelligence   Insight Insight intact   Judgement judgment was intact   Muscle Strength Muscle strength and tone were normal   Language no difficulty naming common objects   Fund of Knowledge displays adequate knowledge of current events   Pain moderate to severe   Pain Scale 4       Laboratory Results: No results found for this or any previous visit      Assessment/Plan:       Diagnoses and all orders for this visit:    Generalized anxiety disorder    Major depressive disorder, recurrent episode, moderate (HCC)          Treatment Recommendations- Risks Benefits      Immediate Medical/Psychiatric/Psychotherapy Treatments and Any Precautions:  reviewed medication continue with treatment plan    Risks, Benefits And Possible Side Effects Of Medications:  {PSYCH RISK, BENEFITS AND POSSIBLE SIDE EFFECTS (Optional):61671    Controlled Medication Discussion: he is aware of safe use and storage of medication     Psychotherapy Provided: 20 min  Supportive therapy  Medication evaluation  Mood assessment  Discuss activities and coping    Goals discussed in session: maintain stable mood with treatment     Treatment Plan:    Completed and signed during the session: Yes - Treatment Plan done but not signed at time of office visit due to:  Plan reviewed by video and verbal consent given due to [address]  August 22, 2020, updated December 30, 2020, December 06, 2021, May 18, 2022, October 17, 2022        Kaz Horton 10/17/22

## 2022-10-17 NOTE — BH TREATMENT PLAN
TREATMENT PLAN (Medication Management Only)         [address]     Name and Date of Jose D Crowe  1950  Date of Treatment Plan: August 22, 2020, updated December 30, 2020, December 06, 2021, May 18, 2022, October 17, 2022  Diagnosis/Diagnoses:    1  Major depressive disorder, recurrent episode, moderate ([address] )    2  Anxiety hyperventilation       Strengths/Personal Resources for Self-Care: supportive family, taking medications as prescribed, ability to communicate needs, average or above intelligence  Area/Areas of need (in own words): anxiety symptoms  1          Long Term Goal: improve control of anxiety  Target Date: 6 months -04/17/2023  Person/Persons responsible for completion of goal: Chepe stone, provider  2          Short Term Objective (s) - How will we reach this goal?:   A  Provider new recommended medication/dosage changes and/or continue medication(s): continue current medications as prescribed  B  continue therapy  C  grounding techniques  Target Date:  6 months -  04/17/2023  Person/Persons Responsible for Completion of Goal: Chepe stone, provider  Progress Towards Goals: continuing treatment  Treatment Modality: medication management every 3 months  Review due 180 days from date of this plan:  6 months - 04/17/2023     Expected length of service: ongoing treatment  My Physician/PA/NP and I have developed this plan together and I agree to work on the goals and objectives  I understand the treatment goals that were developed for my treatment

## 2022-11-29 DIAGNOSIS — F33.1 MAJOR DEPRESSIVE DISORDER, RECURRENT EPISODE, MODERATE (HCC): Chronic | ICD-10-CM

## 2022-11-29 RX ORDER — ESCITALOPRAM OXALATE 10 MG/1
10 TABLET ORAL DAILY
Qty: 90 TABLET | Refills: 0 | Status: SHIPPED | OUTPATIENT
Start: 2022-11-29

## 2023-01-16 ENCOUNTER — TELEMEDICINE (OUTPATIENT)
Dept: PSYCHIATRY | Facility: CLINIC | Age: 73
End: 2023-01-16

## 2023-01-16 DIAGNOSIS — F33.1 MAJOR DEPRESSIVE DISORDER, RECURRENT EPISODE, MODERATE (HCC): Chronic | ICD-10-CM

## 2023-01-16 DIAGNOSIS — F41.1 GENERALIZED ANXIETY DISORDER: Primary | ICD-10-CM

## 2023-01-16 NOTE — PSYCH
Virtual Regular Visit    Problem List Items Addressed This Visit        Other    Major depressive disorder, recurrent episode, moderate (HCC) (Chronic)    Generalized anxiety disorder - Primary     Reason for visit is   Chief Complaint   Patient presents with   • Anxiety   • Depression     Encounter provider Danika Gonzales, PhD    Provider located at [address] [place] [address]  [address]  [place] [address]  [phone]    Recent Visits  No visits were found meeting these conditions  Showing recent visits within past 7 days and meeting all other requirements  Today's Visits  Date Type Provider Dept   01/16/23 Telemedicine Danika Gonzales, PhD [facility]   Showing today's visits and meeting all other requirements  Future Appointments  No visits were found meeting these conditions  Showing future appointments within next 150 days and meeting all other requirements       After connecting through [employer], the patient was identified by name and date of birth  Mike Sanders was informed that this is a telemedicine visit and that the visit is being conducted through the [address] [place] [address] Now platform  He agrees to proceed  which may not be secure and therefore, might not be HIPAA-compliant  My office door was closed  No one else was in the room  He acknowledged consent and understanding of privacy and security of the video platform  The patient has agreed to participate and understands they can discontinue the visit at any time  SUBJECTIVE:    Mike Sanders is a 74 y o  male with a history of anxiety and depression seen for medication management and mood assessment  Rafa Taylor reports felling stable  He is eating and sleeping  Holidays were good  Working and "winding down"  Stopped therapy and is coping  Takes medication as prescribed  Thinking about decreasing Lexapro in half  Family are supportive      HPI ROS Appetite Changes and Sleep: normal appetite and normal energy level    Review Of Systems:     Mood Normal   Behavior Normal    Thought Content Normal   General Normal    Personality Normal   Other Psych Symptoms Normal   Constitutional As Noted in HPI   ENT As Noted in HPI   Cardiovascular As Noted in HPI   Respiratory As Noted in HPI   Gastrointestinal As Noted in HPI   Genitourinary As Noted in HPI   Musculoskeletal As Noted in HPI   Integumentary As Noted in HPI   Neurological As Noted in HPI   Endocrine Normal    Other Symptoms Normal        Substance Abuse History:    Social History     Substance and Sexual Activity   Drug Use Never       Family Psychiatric History:     Family History   Problem Relation Age of Onset   • Depression Mother    • Post-traumatic stress disorder Son        Social History     Socioeconomic History   • Marital status: /Civil Union     Spouse name: Not on file   • Number of children: Not on file   • Years of education: Not on file   • Highest education level: Not on file   Occupational History   • Not on file   Tobacco Use   • Smoking status: Never   • Smokeless tobacco: Never   Vaping Use   • Vaping Use: Not on file   Substance and Sexual Activity   • Alcohol use:  Yes   • Drug use: Never   • Sexual activity: Not Currently     Partners: Female   Other Topics Concern   • Not on file   Social History Narrative   • Not on file     Social Determinants of Health     Financial Resource Strain: Not on file   Food Insecurity: Not on file   Transportation Needs: Not on file   Physical Activity: Not on file   Stress: Not on file   Social Connections: Not on file   Intimate Partner Violence: Not on file   Housing Stability: Not on file       Past Medical History:   Diagnosis Date   • Anxiety    • Depression    • Frequent urination    • Hypercholesterolemia    • Hypertension    • [facility] ([facility] )    • Rib fracture    • Rupture quadriceps tendon    • Seasonal allergies    • Sinusitis    • Sleep difficulties • Tenosynovitis     bilateral stenosing   • Umbilical hernia    • Wears glasses        Past Surgical History:   Procedure Laterality Date   • HAND SURGERY Bilateral    • QUADRICEPS REPAIR  2015   • SINUS SURGERY  1982   • TONSILLECTOMY  4503   • UMBILICAL HERNIA REPAIR  2014       Current Outpatient Medications   Medication Sig Dispense Refill   • Bempedoic Acid-Ezetimibe 180-10 MG TABS Take 1 tablet by mouth daily     • Ciclopirox 1 % shampoo      • clonazePAM (KlonoPIN) 0 5 mg tablet Take 1 tablet (0 5 mg total) by mouth 2 (two) times a day as needed for anxiety 60 tablet 0   • cycloSPORINE (RESTASIS) 0 05 % ophthalmic emulsion 1 drop  in the morning and 1 drop in the evening  • econazole nitrate 1 % cream      • escitalopram (LEXAPRO) 10 mg tablet Take 1 tablet (10 mg total) by mouth daily 90 tablet 0   • ezetimibe (ZETIA) 10 mg tablet Take 10 mg by mouth in the morning  • fluticasone (FLONASE) 50 mcg/act nasal spray      • ketoconazole (NIZORAL) 2 % shampoo      • Livalo 4 MG TABS      • metoprolol succinate (TOPROL-XL) 25 mg 24 hr tablet Take 25 mg by mouth in the morning and 25 mg in the evening  • metoprolol succinate (TOPROL-XL) 25 mg 24 hr tablet Take 25 mg by mouth in the morning and 25 mg in the evening  • Multiple Vitamin (MULTIVITAMIN ADULT PO) Take by mouth     • Nexlizet 180-10 MG TABS      • ranolazine (RANEXA) 1000 MG SR tablet Take 1 tablet by mouth in the morning and 1 tablet in the evening  • sodium polystyrene (KAYEXALATE) powder      • traMADol (ULTRAM) 50 mg tablet 1 tablet as needed     • valsartan (DIOVAN) 40 mg tablet every 24 hours     • valsartan (DIOVAN) 80 mg tablet        No current facility-administered medications for this visit          Allergies   Allergen Reactions   • Pravastatin Other (See Comments)   • Atorvastatin Other (See Comments)   • Other Other (See Comments)   • Pollen Extract Other (See Comments)   • Lovastatin Other (See Comments)   • Simvastatin Other (See Comments)   • Statins Other (See Comments)     Other reaction(s): Liver enzymes abnormal         The following portions of the patient's history were reviewed and updated as appropriate: allergies, current medications, past family history, past medical history, past social history, past surgical history and problem list     OBJECTIVE:     Mental Status Examination:    Appearance calm and cooperative , adequate hygiene and grooming and good eye contact    Mood euthymic   Affect affect appropriate    Speech a normal rate   Thought Processes normal thought processes   Hallucinations no hallucinations present    Thought Content no delusions   Abnormal Thoughts no suicidal thoughts  and no homicidal thoughts    Orientation  oriented to person   Remote Memory short term memory intact and long term memory intact   Attention Span concentration intact   Intellect Appears to be of Average Intelligence   Insight Insight intact   Judgement judgment was intact   Muscle Strength Muscle strength and tone were normal   Language no difficulty naming common objects   Fund of Knowledge displays adequate knowledge of current events   Pain none   Pain Scale 0       Laboratory Results: No results found for this or any previous visit  Assessment/Plan:       Diagnoses and all orders for this visit:    Generalized anxiety disorder    Major depressive disorder, recurrent episode, moderate (HCC)          Treatment Recommendations- Risks Benefits      Immediate Medical/Psychiatric/Psychotherapy Treatments and Any Precautions:  reviewed medication continue with treatment plan, cut lexapro in half call with your response      Risks, Benefits And Possible Side Effects Of Medications:  {PSYCH RISK, BENEFITS AND POSSIBLE SIDE EFFECTS (Optional):81229    Controlled Medication Discussion: he is aware of safe use and storage of medication     Psychotherapy Provided: 30 min  Supportive therapy  Medication evaluation  Mood assessment  Medication education  Coping with potential FCI    Goals discussed in session: stable mood       Treatment Plan:    Completed and signed during the session: Not applicable - Treatment Plan not due at this session enacted August 22, 2020, updated December 30, 2020, December 06, 2021, May 18, 2022, October 17, 2022        Jamaica Frias, PhD 01/16/23

## 2023-01-16 NOTE — PATIENT INSTRUCTIONS
Continue medications  Call with problems or concerns   Call and report response to reducing Lexapro to 5 mg

## 2023-02-10 DIAGNOSIS — F33.1 MAJOR DEPRESSIVE DISORDER, RECURRENT EPISODE, MODERATE (HCC): Chronic | ICD-10-CM

## 2023-02-10 NOTE — TELEPHONE ENCOUNTER
Medication Refill Request     Name of Medication escitalopram (LEXAPRO) 10 mg tablet    Dose/Frequency Take 1 tablet (10 mg total) by mouth daily  Quantity 90  Verified pharmacy   [x]  Verified ordering Provider   [x]  Does patient have enough for the next 3 days? Yes [] No [x]  Does patient have a follow-up appointment scheduled?  Yes [x] No []   If so when is appointment: 04/17/23 @10 AM

## 2023-02-11 RX ORDER — ESCITALOPRAM OXALATE 10 MG/1
10 TABLET ORAL DAILY
Qty: 90 TABLET | Refills: 0 | Status: SHIPPED | OUTPATIENT
Start: 2023-02-11 | End: 2023-02-15

## 2023-02-14 DIAGNOSIS — F33.1 MAJOR DEPRESSIVE DISORDER, RECURRENT EPISODE, MODERATE (HCC): Chronic | ICD-10-CM

## 2023-02-15 RX ORDER — ESCITALOPRAM OXALATE 10 MG/1
10 TABLET ORAL DAILY
Qty: 90 TABLET | Refills: 0 | Status: SHIPPED | OUTPATIENT
Start: 2023-02-15

## 2023-06-12 DIAGNOSIS — F33.1 MAJOR DEPRESSIVE DISORDER, RECURRENT EPISODE, MODERATE (HCC): Chronic | ICD-10-CM

## 2023-06-12 RX ORDER — ESCITALOPRAM OXALATE 10 MG/1
10 TABLET ORAL DAILY
Qty: 90 TABLET | Refills: 0 | Status: SHIPPED | OUTPATIENT
Start: 2023-06-12 | End: 2023-06-15

## 2023-06-12 NOTE — TELEPHONE ENCOUNTER
Medication Refill Request     Name of Medication escitalopram (LEXAPRO) 10 mg tablet  Dose/Frequency TAKE 1 TABLET (10 MG TOTAL) BY MOUTH DAILY  Quantity 80  Verified pharmacy   [x]  Verified ordering Provider   [x]  Does patient have enough for the next 3 days? Yes [] No [x]  Does patient have a follow-up appointment scheduled?  Yes [x] No []   If so when is appointment: 07/24/23 @ 11 AM

## 2023-06-15 DIAGNOSIS — F33.1 MAJOR DEPRESSIVE DISORDER, RECURRENT EPISODE, MODERATE (HCC): Chronic | ICD-10-CM

## 2023-06-15 RX ORDER — ESCITALOPRAM OXALATE 10 MG/1
10 TABLET ORAL DAILY
Qty: 90 TABLET | Refills: 0 | Status: SHIPPED | OUTPATIENT
Start: 2023-06-15

## 2023-06-15 NOTE — TELEPHONE ENCOUNTER
Pt called about Script of his Lexapro , I see it was sent on 06/12/23 to pharmacy on file, I also called the [place] pharmacy and the pharmacist state that they do not have the script can you please resend it

## 2023-07-24 ENCOUNTER — TELEMEDICINE (OUTPATIENT)
Dept: PSYCHIATRY | Facility: CLINIC | Age: 73
End: 2023-07-24
Payer: MEDICARE

## 2023-07-24 DIAGNOSIS — F45.8 ANXIETY HYPERVENTILATION: Chronic | ICD-10-CM

## 2023-07-24 DIAGNOSIS — F41.1 GENERALIZED ANXIETY DISORDER: Primary | ICD-10-CM

## 2023-07-24 DIAGNOSIS — F33.1 MAJOR DEPRESSIVE DISORDER, RECURRENT EPISODE, MODERATE (HCC): Chronic | ICD-10-CM

## 2023-07-24 DIAGNOSIS — F41.9 ANXIETY HYPERVENTILATION: Chronic | ICD-10-CM

## 2023-07-24 PROCEDURE — 99214 OFFICE O/P EST MOD 30 MIN: CPT | Performed by: NURSE PRACTITIONER

## 2023-07-24 PROCEDURE — 90833 PSYTX W PT W E/M 30 MIN: CPT | Performed by: NURSE PRACTITIONER

## 2023-07-24 RX ORDER — ESCITALOPRAM OXALATE 10 MG/1
10 TABLET ORAL DAILY
Qty: 90 TABLET | Refills: 0 | Status: SHIPPED | OUTPATIENT
Start: 2023-07-24

## 2023-07-25 NOTE — BH CRISIS PLAN
Client Name: Campbell Vargas       Client YOB: 1950  : 1950    Treatment Team (include name and contact information):     Psychotherapist:myrna    Psychiatrist: [facility]       Healthcare Provider  Dick Palma  [address] [facility] [address]      Type of Plan   * Child plans (children 15 yo and younger) must be completed and signed by the child's legal guardian   * Plans for all individuals 15 yo and above must be signed by the client. Plan Type: adolescent/adult (15 and over) Initial      My Personal Strengths are (in the client's own words):  "smart and able to verbalize needs"  The stressors and triggers that may put me at risk are:  thoughts of own mortality    Coping skills I can use to keep myself calm and safe:  Physical activity and Call a friend or family member    Coping skills/supports I can use to maintain abstinence from substance use:   Not Applicable    The people that provide me with help and support: (Include name, contact, and how they can help)   Support person #1: wife    * Phone number: lives with    * How can they help me? Support and talk     In the past, the following has helped me in times of crisis:    Being with other people and Taking medications      If it is an emergency and you need immediate help, call     If there is a possibility of danger to yourself or others, call the following crisis hotline resources:     Adult Crisis Numbers  Suicide Prevention Hotline - Dial   [facility]: [address] [place] [address] Street: [address]: [address]: [phone]  [address] [place] [address] Street: [address]: [address]: [address]: [phone] (daytime).        [phone] (after hours, weekends, holidays)     Child/Adolescent Crisis Numbers   [facility] [place]: [address]: [phone]   [place] : [phone]   [facility]: [phone]    Please note: Some counties do not have a separate number for Child/Adolescent specific crisis. If your county is not listed under Child/Adolescent, please call the adult number for your county     National Talk to Text Line   [employer] - [phone]    In the event your feelings become unmanageable, and you cannot reach your support system, you will call 911 immediately or go to the nearest hospital emergency room.

## 2023-07-25 NOTE — PSYCH
Virtual Regular Visit    Problem List Items Addressed This Visit        Other    Major depressive disorder, recurrent episode, moderate (HCC) (Chronic)    Relevant Medications    escitalopram (LEXAPRO) 10 mg tablet    Generalized anxiety disorder - Primary    Relevant Medications    escitalopram (LEXAPRO) 10 mg tablet     Reason for visit is   Chief Complaint   Patient presents with   • Anxiety   • Depression   • Medication Management     Encounter provider Fredi Guerra PhD    Provider located at [address] [place] [address]  [address] [facility]  [address] [place] [address]  [phone]    Recent Visits  Date Type Provider Dept   07/24/23 Telemedicine Fredi Guerra PhD [facility]   Showing recent visits within past 7 days and meeting all other requirements  Future Appointments  No visits were found meeting these conditions. Showing future appointments within next 150 days and meeting all other requirements       After connecting through [employer], the patient was identified by name and date of birth. Kenny Brand was informed that this is a telemedicine visit and that the visit is being conducted through the [employer]. He agrees to proceed. which may not be secure and therefore, might not be HIPAA-compliant. My office door was closed. No one else was in the room. He acknowledged consent and understanding of privacy and security of the video platform. The patient has agreed to participate and understands they can discontinue the visit at any time. SUBJECTIVE:    Kenny Brand is a 74 y.o. male with a history of anxiety and depression seen for medication management and mood assessment. Sue Mejia reports he went on a trip out of the country and ended up with COVID after the trip. He reports he is feeling better moods are stable. He reports rare use of Klonopin. He requests a possible change of Lexapro due to low libido. Discussed possible options. He will think about. Reports he is eating and sleeping well. Occasionally has thoughts about his own mortality. Takes medication as prescribed and family are supportive. HPI ROS Appetite Changes and Sleep: normal appetite and normal energy level    Review Of Systems:     Mood Anxiety   Behavior Normal    Thought Content Disturbing Thoughts, Feelings   General Emotional Problems   Personality Normal   Other Psych Symptoms Normal   Constitutional As Noted in HPI   ENT As Noted in HPI   Cardiovascular As Noted in HPI   Respiratory As Noted in HPI   Gastrointestinal As Noted in HPI   Genitourinary As Noted in HPI   Musculoskeletal As Noted in HPI   Integumentary As Noted in HPI   Neurological As Noted in HPI   Endocrine Normal    Other Symptoms Normal        Substance Abuse History:    Social History     Substance and Sexual Activity   Drug Use Never       Family Psychiatric History:     Family History   Problem Relation Age of Onset   • Depression Mother    • Post-traumatic stress disorder Son        Social History     Socioeconomic History   • Marital status: /Civil Union     Spouse name: Not on file   • Number of children: Not on file   • Years of education: Not on file   • Highest education level: Not on file   Occupational History   • Not on file   Tobacco Use   • Smoking status: Never   • Smokeless tobacco: Never   Vaping Use   • Vaping Use: Not on file   Substance and Sexual Activity   • Alcohol use:  Yes   • Drug use: Never   • Sexual activity: Not Currently     Partners: Female   Other Topics Concern   • Not on file   Social History Narrative   • Not on file     Social Determinants of Health     Financial Resource Strain: Not on file   Food Insecurity: Not on file   Transportation Needs: Not on file   Physical Activity: Not on file   Stress: Not on file   Social Connections: Not on file   Intimate Partner Violence: Not on file   Housing Stability: Not on file       Past Medical History:   Diagnosis Date   • Anxiety    • Depression    • Frequent urination    • Hypercholesterolemia    • Hypertension    • Rheumatoid arthritis ([address])    • Rib fracture    • Rupture quadriceps tendon    • Seasonal allergies    • Sinusitis    • Sleep difficulties    • Tenosynovitis     bilateral stenosing   • Umbilical hernia    • Wears glasses        Past Surgical History:   Procedure Laterality Date   • HAND SURGERY Bilateral    • QUADRICEPS REPAIR  2015   • SINUS SURGERY  1982   • TONSILLECTOMY  2597   • UMBILICAL HERNIA REPAIR  2014       Current Outpatient Medications   Medication Sig Dispense Refill   • escitalopram (LEXAPRO) 10 mg tablet Take 1 tablet (10 mg total) by mouth daily 90 tablet 0   • Bempedoic Acid-Ezetimibe 180-10 MG TABS Take 1 tablet by mouth daily     • Ciclopirox 1 % shampoo      • clonazePAM (KlonoPIN) 0.5 mg tablet Take 1 tablet (0.5 mg total) by mouth 2 (two) times a day as needed for anxiety 60 tablet 0   • cycloSPORINE (RESTASIS) 0.05 % ophthalmic emulsion 1 drop  in the morning and 1 drop in the evening. • econazole nitrate 1 % cream      • ezetimibe (ZETIA) 10 mg tablet Take 10 mg by mouth in the morning. • fluticasone (FLONASE) 50 mcg/act nasal spray      • ketoconazole (NIZORAL) 2 % shampoo      • Livalo 4 MG TABS      • metoprolol succinate (TOPROL-XL) 25 mg 24 hr tablet Take 25 mg by mouth in the morning and 25 mg in the evening. • metoprolol succinate (TOPROL-XL) 25 mg 24 hr tablet Take 25 mg by mouth in the morning and 25 mg in the evening. • Multiple Vitamin (MULTIVITAMIN ADULT PO) Take by mouth     • Nexlizet 180-10 MG TABS      • ranolazine (RANEXA) 1000 MG SR tablet Take 1 tablet by mouth in the morning and 1 tablet in the evening.      • sodium polystyrene (KAYEXALATE) powder      • traMADol (ULTRAM) 50 mg tablet 1 tablet as needed     • valsartan (DIOVAN) 40 mg tablet every 24 hours     • valsartan (DIOVAN) 80 mg tablet        No current facility-administered medications for this visit. Allergies   Allergen Reactions   • Pravastatin Other (See Comments)   • Atorvastatin Other (See Comments)   • Other Other (See Comments)   • Pollen Extract Other (See Comments)   • Lovastatin Other (See Comments)   • Simvastatin Other (See Comments)   • Statins Other (See Comments)     Other reaction(s): Liver enzymes abnormal         The following portions of the patient's history were reviewed and updated as appropriate: allergies, current medications, past family history, past medical history, past social history, past surgical history and problem list.    OBJECTIVE:     Mental Status Examination:    Appearance calm and cooperative , adequate hygiene and grooming and good eye contact    Mood anxious   Affect affect appropriate    Speech a normal rate   Thought Processes normal thought processes   Hallucinations no hallucinations present    Thought Content no delusions   Abnormal Thoughts no suicidal thoughts  and no homicidal thoughts    Orientation  oriented to person and place and time   Remote Memory short term memory intact and long term memory intact   Attention Span concentration intact   Intellect Appears to be of Average Intelligence   Insight Insight intact   Judgement judgment was intact   Muscle Strength Muscle strength and tone were normal   Language no difficulty naming common objects   Fund of Knowledge displays adequate knowledge of current events   Pain none   Pain Scale 0       Laboratory Results: No results found for this or any previous visit. Assessment/Plan:       Diagnoses and all orders for this visit:    Generalized anxiety disorder    Major depressive disorder, recurrent episode, moderate (HCC)  -     escitalopram (LEXAPRO) 10 mg tablet;  Take 1 tablet (10 mg total) by mouth daily          Treatment Recommendations- Risks Benefits      Immediate Medical/Psychiatric/Psychotherapy Treatments and Any Precautions:  reviewed medication continue with treatment plan    Risks, Benefits And Possible Side Effects Of Medications:  {PSYCH RISK, BENEFITS AND POSSIBLE SIDE EFFECTS (Optional):88027    Controlled Medication Discussion: He is aware of safe use and storage of medication     Psychotherapy Provided: 30 min  Supportive therapy  Medication evaluation mood assessment    Discussed possible options to address side effect of low libido    Goals discussed in session: Maintain stable mood         Treatment Plan:    Completed and signed during the session: Not applicable - Treatment Plan not due at this session    Charles Kwong, PhD 07/25/23

## 2024-01-23 DIAGNOSIS — F33.1 MAJOR DEPRESSIVE DISORDER, RECURRENT EPISODE, MODERATE (HCC): Chronic | ICD-10-CM

## 2024-01-23 RX ORDER — ESCITALOPRAM OXALATE 10 MG/1
10 TABLET ORAL DAILY
Qty: 90 TABLET | Refills: 0 | Status: SHIPPED | OUTPATIENT
Start: 2024-01-23

## 2024-01-23 NOTE — TELEPHONE ENCOUNTER
Medication Refill Request     Name of Medication  escitalopram (LEXAPRO) 10 mg tablet   Dose/Frequency   Take 1 tablet (10 mg total) by mouth daily   Quantity  90  Verified pharmacy   [x]  Verified ordering Provider   [x]  Does patient have enough for the next 3 days? Yes [] No [x]  Does patient have a follow-up appointment scheduled? Yes [x] No []   If so when is appointment: 02/14/24 @ 10:30 am

## 2024-02-14 ENCOUNTER — TELEMEDICINE (OUTPATIENT)
Dept: PSYCHIATRY | Facility: CLINIC | Age: 74
End: 2024-02-14
Payer: MEDICARE

## 2024-02-14 DIAGNOSIS — F41.1 GENERALIZED ANXIETY DISORDER: ICD-10-CM

## 2024-02-14 DIAGNOSIS — F41.9 ANXIETY HYPERVENTILATION: Chronic | ICD-10-CM

## 2024-02-14 DIAGNOSIS — F33.1 MAJOR DEPRESSIVE DISORDER, RECURRENT EPISODE, MODERATE (HCC): Primary | Chronic | ICD-10-CM

## 2024-02-14 DIAGNOSIS — F45.8 ANXIETY HYPERVENTILATION: Chronic | ICD-10-CM

## 2024-02-14 PROCEDURE — 90833 PSYTX W PT W E/M 30 MIN: CPT | Performed by: NURSE PRACTITIONER

## 2024-02-14 PROCEDURE — G2211 COMPLEX E/M VISIT ADD ON: HCPCS | Performed by: NURSE PRACTITIONER

## 2024-02-14 PROCEDURE — 99214 OFFICE O/P EST MOD 30 MIN: CPT | Performed by: NURSE PRACTITIONER

## 2024-02-14 RX ORDER — HYDROCODONE BITARTRATE AND HOMATROPINE METHYLBROMIDE ORAL SOLUTION 5; 1.5 MG/5ML; MG/5ML
LIQUID ORAL
COMMUNITY
Start: 2024-02-06

## 2024-02-14 RX ORDER — AMOXICILLIN AND CLAVULANATE POTASSIUM 875; 125 MG/1; MG/1
1 TABLET, FILM COATED ORAL EVERY 12 HOURS
COMMUNITY
Start: 2024-02-06

## 2024-02-14 RX ORDER — PREDNISONE 20 MG/1
TABLET ORAL
COMMUNITY
Start: 2024-02-06

## 2024-02-14 RX ORDER — EVOLOCUMAB 140 MG/ML
INJECTION, SOLUTION SUBCUTANEOUS
COMMUNITY
Start: 2023-12-26

## 2024-02-14 RX ORDER — AMLODIPINE BESYLATE 5 MG/1
5 TABLET ORAL 2 TIMES DAILY
COMMUNITY

## 2024-02-14 NOTE — PSYCH
"Virtual Regular Visit    Problem List Items Addressed This Visit    None    Reason for visit is No chief complaint on file.    Encounter provider Molly Bob, PhD    Provider located at [address]  [address]  [facility] [id number] [phone]    Recent Visits  No visits were found meeting these conditions.  Showing recent visits within past 7 days and meeting all other requirements  Future Appointments  No visits were found meeting these conditions.  Showing future appointments within next 150 days and meeting all other requirements       After connecting through [employer], the patient was identified by name and date of birth. Chepe Cuevas was informed that this is a telemedicine visit and that the visit is being conducted through the Epic Embedded platform. He agrees to proceed. which may not be secure and therefore, might not be HIPAA-compliant.  My office door was closed. The patient was notified no one was present in the room .  He acknowledged consent and understanding of privacy and security of the video platform. The patient has agreed to participate and understands they can discontinue the visit at any time.    SUBJECTIVE:    Chepe Cuevas is a 73 y.o. male with a history of history of anxiety and depression seen for medication management and mood assessment.  Chepe reports that his moods are stable \"it is rare to have a grey moment\" 95% appetite and sleep is good golfing on occasion, visiting family.  Denies depression or anxiety.  DOMINIC-7 score of 2 indicates no anxiety, PHQ-9 score of 1 indicates no depression and no suicidal ideation.  He takes his medication as prescribed.  And family are supportive.    HPI ROS Appetite Changes and Sleep: normal appetite and normal energy level    Review Of Systems:     Mood Normal   Behavior Normal    Thought Content Normal   General Normal    Personality Normal   Other Psych " Symptoms Normal   Constitutional Normal   ENT As Noted in HPI   Cardiovascular As Noted in HPI   Respiratory As Noted in HPI   Gastrointestinal As Noted in HPI   Genitourinary As Noted in HPI   Musculoskeletal As Noted in HPI   Integumentary As Noted in HPI   Neurological As Noted in HPI   Endocrine Normal    Other Symptoms Normal        Substance Abuse History:    Social History     Substance and Sexual Activity   Drug Use Never       Family Psychiatric History:     Family History   Problem Relation Age of Onset    Depression Mother     Post-traumatic stress disorder Son        Social History     Socioeconomic History    Marital status: /Civil Union     Spouse name: Not on file    Number of children: Not on file    Years of education: Not on file    Highest education level: Not on file   Occupational History    Not on file   Tobacco Use    Smoking status: Never    Smokeless tobacco: Never   Vaping Use    Vaping status: Not on file   Substance and Sexual Activity    Alcohol use: Yes    Drug use: Never    Sexual activity: Not Currently     Partners: Female   Other Topics Concern    Not on file   Social History Narrative    Not on file     Social Determinants of Health     Financial Resource Strain: Low Risk  (12/13/2021)    Received from [facility]    Overall Financial Resource Strain (CARDIA)     Difficulty of Paying Living Expenses: Not hard at all   Food Insecurity: Unknown (12/13/2021)    Received from [facility]    Hunger Vital Sign     Worried About Running Out of Food in the Last Year: Never true     Ran Out of Food in the Last Year: Not on file   Transportation Needs: Unknown (12/13/2021)    Received from [facility]    PRAPARE - Transportation     Lack of Transportation (Medical): No     Lack of Transportation (Non-Medical): Not on file   Physical Activity: Unknown (12/13/2021)    Received from [facility]    Exercise Vital Sign     Days of Exercise per Week: 3 days     Minutes of  Exercise per Session: Not on file   Stress: Not on file   Social Connections: Unknown (12/13/2021)    Received from [facility]    Social Connection and Isolation Panel [NHANES]     Frequency of Communication with Friends and Family: More than three times a week     Frequency of Social Gatherings with Friends and Family: Not on file     Attends Hinduism Services: Not on file     Active Member of Clubs or Organizations: Not on file     Attends Club or Organization Meetings: Not on file     Marital Status: Not on file   Intimate Partner Violence: Unknown (12/13/2021)    Received from [facility]    Humiliation, Afraid, Rape, and Kick questionnaire     Fear of Current or Ex-Partner: No     Emotionally Abused: Not on file     Physically Abused: Not on file     Sexually Abused: Not on file   Housing Stability: Unknown (12/13/2021)    Received from [facility]    Housing Stability Vital Sign     Unable to Pay for Housing in the Last Year: No     Number of Places Lived in the Last Year: Not on file     Unstable Housing in the Last Year: Not on file       Past Medical History:   Diagnosis Date    Anxiety     Depression     Frequent urination     Hypercholesterolemia     Hypertension     Rheumatoid arthritis (HCC)     Rib fracture     Rupture quadriceps tendon     Seasonal allergies     Sinusitis     Sleep difficulties     Tenosynovitis     bilateral stenosing    Umbilical hernia     Wears glasses        Past Surgical History:   Procedure Laterality Date    HAND SURGERY Bilateral     QUADRICEPS REPAIR  2015    SINUS SURGERY  1982    TONSILLECTOMY  1982    UMBILICAL HERNIA REPAIR  2014       Current Outpatient Medications   Medication Sig Dispense Refill    amoxicillin-clavulanate (AUGMENTIN) 875-125 mg per tablet Take 1 tablet by mouth every 12 (twelve) hours      HYDROcodone Bit-Homatrop MBr (HYCODAN) 5-1.5 mg/5 mL syrup TAKE 5 MILLILITER BY ORAL ROUTE EVERY 4 - 6 HOURS AS NEEDED AS NEEDED      predniSONE 20 mg  tablet PLEASE SEE ATTACHED FOR DETAILED DIRECTIONS      Repatha SureClick 140 MG/ML SOAJ INJECT 140MG SUBCUTANEOUSLY EVERY 2 WEEKS      amLODIPine (NORVASC) 5 mg tablet Take 5 mg by mouth 2 (two) times a day      Bempedoic Acid-Ezetimibe 180-10 MG TABS Take 1 tablet by mouth daily      Ciclopirox 1 % shampoo       clonazePAM (KlonoPIN) 0.5 mg tablet Take 1 tablet (0.5 mg total) by mouth 2 (two) times a day as needed for anxiety 60 tablet 0    cycloSPORINE (RESTASIS) 0.05 % ophthalmic emulsion 1 drop  in the morning and 1 drop in the evening.      econazole nitrate 1 % cream       escitalopram (LEXAPRO) 10 mg tablet Take 1 tablet (10 mg total) by mouth daily 90 tablet 0    ezetimibe (ZETIA) 10 mg tablet Take 10 mg by mouth in the morning.      fluticasone (FLONASE) 50 mcg/act nasal spray       ketoconazole (NIZORAL) 2 % shampoo       Livalo 4 MG TABS       metoprolol succinate (TOPROL-XL) 25 mg 24 hr tablet Take 25 mg by mouth in the morning and 25 mg in the evening.      metoprolol succinate (TOPROL-XL) 25 mg 24 hr tablet Take 25 mg by mouth in the morning and 25 mg in the evening.      Multiple Vitamin (MULTIVITAMIN ADULT PO) Take by mouth      ranolazine (RANEXA) 1000 MG SR tablet Take 1 tablet by mouth in the morning and 1 tablet in the evening.      traMADol (ULTRAM) 50 mg tablet 1 tablet as needed      valsartan (DIOVAN) 40 mg tablet every 24 hours       No current facility-administered medications for this visit.        Allergies   Allergen Reactions    Pravastatin Other (See Comments)    Atorvastatin Other (See Comments)    Other Other (See Comments)    Pollen Extract Other (See Comments)    Lovastatin Other (See Comments)    Simvastatin Other (See Comments)    Statins Other (See Comments)     Other reaction(s): Liver enzymes abnormal         The following portions of the patient's history were reviewed and updated as appropriate: allergies, current medications, past family history, past medical history, past  social history, past surgical history, and problem list.    OBJECTIVE:     Mental Status Examination:    Appearance calm and cooperative , adequate hygiene and grooming, and good eye contact    Mood euthymic   Affect affect appropriate    Speech a normal rate   Thought Processes normal thought processes   Hallucinations no hallucinations present    Thought Content no delusions   Abnormal Thoughts no suicidal thoughts  and no homicidal thoughts    Orientation  oriented to person and place and time   Remote Memory short term memory intact and long term memory intact   Attention Span concentration intact   Intellect Appears to be of Average Intelligence   Insight Insight intact   Judgement judgment was intact   Muscle Strength Muscle strength and tone were normal   Language no difficulty naming common objects   Fund of Knowledge displays adequate knowledge of current events   Pain none   Pain Scale 0       Laboratory Results: No results found for this or any previous visit.    Assessment/Plan:       There are no diagnoses linked to this encounter.      Treatment Recommendations- Risks Benefits      Immediate Medical/Psychiatric/Psychotherapy Treatments and Any Precautions: Continue treatment plan    Risks, Benefits And Possible Side Effects Of Medications:  {PSYCH RISK, BENEFITS AND POSSIBLE SIDE EFFECTS (Optional):97069    Controlled Medication Discussion: He is aware of safe use and storage of medication    Psychotherapy Provided: 30 minutes  Supportive therapy  Medication evaluation  Mood assessment  Survey results reviewed and confirmed  Treatment plan updated  Safety plan developed  Goals discussed in session: Maintain stable mood       Treatment Plan:    Completed and signed during the session: Yes - Treatment Plan done but not signed at time of office visit due to:  Plan reviewed by video and verbal consent given due to virtual visit. Treatment Plan sent to patient via [employer] for signature.      Molly Bob  PhD 02/14/24

## 2024-02-14 NOTE — BH TREATMENT PLAN
TREATMENT PLAN (Medication Management Only)         [facility]     Name and Date of Birth:  Chepe Cuevas 73 y.o. 1950  Date of Treatment Plan: August 22, 2020, updated December 30, 2020, December 06, 2021, May 18, 2022, October 17, 2022, April 17, 2023, February 14, 2024  Diagnosis/Diagnoses:    1. Major depressive disorder, recurrent episode, moderate (HCC)    2. Anxiety hyperventilation       Strengths/Personal Resources for Self-Care: supportive family, taking medications as prescribed, ability to communicate needs, average or above intelligence.  Area/Areas of need (in own words): anxiety symptoms  1.         Long Term Goal: improve control of anxiety.  Target Date: 6 months -8/14/2024  Person/Persons responsible for completion of goal: Chepe stone, provider  2.         Short Term Objective (s) - How will we reach this goal?:   A. Provider new recommended medication/dosage changes and/or continue medication(s): continue current medications as prescribed.  B. continue therapy.  C. grounding techniques.  Target Date:  6 months -  8/14/2024  Person/Persons Responsible for Completion of Goal: Chepe therapist, provider  Progress Towards Goals: continuing treatment  Treatment Modality: medication management every 3 months  Review due 180 days from date of this plan:  6 months - 8/14/2024     Expected length of service: ongoing treatment  My Physician/PA/NP and I have developed this plan together and I agree to work on the goals and objectives. I understand the treatment goals that were developed for my treatment.

## 2024-02-19 NOTE — BH CRISIS PLAN
Client Name: Chepe Cuevas       Client YOB: 1950    Jocelyne Safety Plan      Creation Date: 2/14/24 Update Date: 2/14/24   Created By: Molly Bob, PhD       Step 1: Warning Signs:   Warning Signs   when wakes up early morning, before sun rises, has anxious thoughts   recalls the past rare happens            Step 2: Internal Coping Strategies:   Internal Coping Strategies   pop out of bed and get going            Step 3: People and social settings that provide distraction:   Name Contact Information   none     Places   get up do routine things           Step 4: People whom I can ask for help during a crisis:      Name Contact Information    maybe a friend of his cell    son cell      Step 5: Professionals or agencies I can contact during a crisis:      [facility]inican/Agency Name Phone Emergency Contact    [facility] [phone] Dr. Bob      Local Emergency Department Emergency Department Phone [place] Department Address    [place]          Crisis Phone Numbers:   Suicide Prevention Lifeline: Call or Text  045 Crisis Text Line: Text HOME to 774-395   Please note: Some [facility] do not have a separate number for Child/Adolescent specific crisis. If your county is not listed under Child/Adolescent, please call the adult number for your county      Adult Crisis Numbers: Child/Adolescent Crisis Numbers   [facility]: [phone] [facility]: [phone]   [facility]: [phone] [facility]: [phone]   [facility]: [phone] [place], [place]: [phone]   [facility]: [phone] [place]: [phone]   [facility]: [phone]   [facility]: [phone]   [facility]: [phone]   [facility]: [phone] (daytime) [phone] (after hours, weekends, holidays)      Step 6: Making the environment safer (plan for lethal means safety):   Patient did not identify any lethal methods: Yes     Optional: What is  most important to me and worth living for?   Family,     Jocelyne Safety Plan. Mary Chávez and Rick Tilley. Used with permission of the authors.

## 2024-04-09 ENCOUNTER — TELEMEDICINE (OUTPATIENT)
Dept: PSYCHIATRY | Facility: CLINIC | Age: 74
End: 2024-04-09
Payer: MEDICARE

## 2024-04-09 DIAGNOSIS — F41.1 GENERALIZED ANXIETY DISORDER: ICD-10-CM

## 2024-04-09 DIAGNOSIS — F33.1 MAJOR DEPRESSIVE DISORDER, RECURRENT EPISODE, MODERATE (HCC): Primary | Chronic | ICD-10-CM

## 2024-04-09 DIAGNOSIS — F45.8 ANXIETY HYPERVENTILATION: Chronic | ICD-10-CM

## 2024-04-09 DIAGNOSIS — F41.9 ANXIETY HYPERVENTILATION: Chronic | ICD-10-CM

## 2024-04-09 PROCEDURE — 99214 OFFICE O/P EST MOD 30 MIN: CPT | Performed by: NURSE PRACTITIONER

## 2024-04-09 PROCEDURE — 90833 PSYTX W PT W E/M 30 MIN: CPT | Performed by: NURSE PRACTITIONER

## 2024-04-09 RX ORDER — ALBUTEROL SULFATE 90 UG/1
AEROSOL, METERED RESPIRATORY (INHALATION)
COMMUNITY
Start: 2024-04-06

## 2024-04-09 NOTE — PSYCH
Virtual Regular Visit    Problem List Items Addressed This Visit          Behavioral Health    Anxiety hyperventilation (Chronic)    Major depressive disorder, recurrent episode, moderate (HCC) - Primary (Chronic)    Generalized anxiety disorder     Reason for visit is   Chief Complaint   Patient presents with    Anxiety    Depression    Medication Management     Encounter provider Molly Bob, PhD    Provider located at [facility] [address]  [address]  [facility] [id number] [phone]    Recent Visits  No visits were found meeting these conditions.  Showing recent visits within past 7 days and meeting all other requirements  Today's Visits  Date Type Provider Dept   04/09/24 Telemedicine Molly Bob, PhD [facility]   Showing today's visits and meeting all other requirements  Future Appointments  No visits were found meeting these conditions.  Showing future appointments within next 150 days and meeting all other requirements       After connecting through [employer], the patient was identified by name and date of birth. Chepe Cuevas was informed that this is a telemedicine visit and that the visit is being conducted through the Epic Embedded platform. He agrees to proceed. which may not be secure and therefore, might not be HIPAA-compliant.  My office door was closed. No one else was in the room.  He acknowledged consent and understanding of privacy and security of the video platform. The patient has agreed to participate and understands they can discontinue the visit at any time.    SUBJECTIVE:    Chepe Cuevas is a 73 y.o. male with a history of history of anxiety and depression seen for medication management and mood assessment..  Reports being in [place] for 10 weeks with family and wife, wife and he played golf frequently and more with each other most of the time.  By 8 weeks into the vacation he  noticed they were bickering a lot and he wanted to discuss that with me.  We discussed that this was an usually long time together and that is the probable cause.  He does admit that once they have returned to home and has improved.  He reports he is eating and sleeping well denies anxiety or depression and no suicidal ideation.  He reports having problems with intimacy because of the Lexapro affecting his libido.  We discussed the option of switching to Wellbutrin and he will discuss with his cardiology physician.  Takes medication as prescribed and reports his moods have been stable family are supportive.    HPI ROS Appetite Changes and Sleep: normal appetite and normal energy level    Review Of Systems:     Mood Normal   Behavior Normal    Thought Content Normal   General Normal    Personality Normal   Other Psych Symptoms Normal   Constitutional Normal   ENT As Noted in HPI   Cardiovascular As Noted in HPI   Respiratory As Noted in HPI   Gastrointestinal As Noted in HPI   Genitourinary As Noted in HPI   Musculoskeletal As Noted in HPI   Integumentary As Noted in HPI   Neurological As Noted in HPI   Endocrine Normal    Other Symptoms Normal        Substance Abuse History:    Social History     Substance and Sexual Activity   Drug Use Never       Family Psychiatric History:     Family History   Problem Relation Age of Onset    Depression Mother     Post-traumatic stress disorder Son        Social History     Socioeconomic History    Marital status: /Civil Union     Spouse name: Not on file    Number of children: Not on file    Years of education: Not on file    Highest education level: Not on file   Occupational History    Not on file   Tobacco Use    Smoking status: Never    Smokeless tobacco: Never   Vaping Use    Vaping status: Not on file   Substance and Sexual Activity    Alcohol use: Yes    Drug use: Never    Sexual activity: Not Currently     Partners: Female   Other Topics Concern    Not on file    Social History Narrative    Not on file     Social Determinants of Health     Financial Resource Strain: Low Risk  (12/13/2021)    Received from [facility]    Overall Financial Resource Strain (CARDIA)     Difficulty of Paying Living Expenses: Not hard at all   Food Insecurity: Unknown (12/13/2021)    Received from [facility]    Hunger Vital Sign     Worried About Running Out of Food in the Last Year: Never true     Ran Out of Food in the Last Year: Not on file   Transportation Needs: Unknown (12/13/2021)    Received from [facility]    PRAPARE - Transportation     Lack of Transportation (Medical): No     Lack of Transportation (Non-Medical): Not on file   Physical Activity: Unknown (12/13/2021)    Received from [facility]    Exercise Vital Sign     Days of Exercise per Week: 3 days     Minutes of Exercise per Session: Not on file   Stress: Not on file   Social Connections: Unknown (12/13/2021)    Received from [facility]    Social Connection and Isolation Panel [NHANES]     Frequency of Communication with Friends and Family: More than three times a week     Frequency of Social Gatherings with Friends and Family: Not on file     Attends Confucianism Services: Not on file     Active Member of Clubs or Organizations: Not on file     Attends Club or Organization Meetings: Not on file     Marital Status: Not on file   Intimate Partner Violence: Unknown (12/13/2021)    Received from [facility]    Humiliation, Afraid, Rape, and Kick questionnaire     Fear of Current or Ex-Partner: No     Emotionally Abused: Not on file     Physically Abused: Not on file     Sexually Abused: Not on file   Housing Stability: Unknown (12/13/2021)    Received from [facility]    Housing Stability Vital Sign     Unable to Pay for Housing in the Last Year: No     Number of Places Lived in the Last Year: Not on file     Unstable Housing in the Last Year: Not on file       Past Medical History:   Diagnosis Date     Anxiety     Depression     Frequent urination     Hypercholesterolemia     Hypertension     Rheumatoid arthritis (HCC)     Rib fracture     Rupture quadriceps tendon     Seasonal allergies     Sinusitis     Sleep difficulties     Tenosynovitis     bilateral stenosing    Umbilical hernia     Wears glasses        Past Surgical History:   Procedure Laterality Date    HAND SURGERY Bilateral     QUADRICEPS REPAIR  2015    SINUS SURGERY  1982    TONSILLECTOMY  1982    UMBILICAL HERNIA REPAIR  2014       Current Outpatient Medications   Medication Sig Dispense Refill    albuterol (PROVENTIL HFA,VENTOLIN HFA) 90 mcg/act inhaler TAKE 2 PUFFS BY MOUTH EVERY 4 TO 6 HOURS AS NEEDED      amLODIPine (NORVASC) 5 mg tablet Take 5 mg by mouth 2 (two) times a day      amoxicillin-clavulanate (AUGMENTIN) 875-125 mg per tablet Take 1 tablet by mouth every 12 (twelve) hours      Ciclopirox 1 % shampoo       clonazePAM (KlonoPIN) 0.5 mg tablet Take 1 tablet (0.5 mg total) by mouth 2 (two) times a day as needed for anxiety 60 tablet 0    cycloSPORINE (RESTASIS) 0.05 % ophthalmic emulsion 1 drop  in the morning and 1 drop in the evening.      econazole nitrate 1 % cream       escitalopram (LEXAPRO) 10 mg tablet Take 1 tablet (10 mg total) by mouth daily 90 tablet 0    ezetimibe (ZETIA) 10 mg tablet Take 10 mg by mouth in the morning.      fluticasone (FLONASE) 50 mcg/act nasal spray       HYDROcodone Bit-Homatrop MBr (HYCODAN) 5-1.5 mg/5 mL syrup TAKE 5 MILLILITER BY ORAL ROUTE EVERY 4 - 6 HOURS AS NEEDED AS NEEDED      ketoconazole (NIZORAL) 2 % shampoo       Livalo 4 MG TABS       metoprolol succinate (TOPROL-XL) 25 mg 24 hr tablet Take 25 mg by mouth in the morning and 25 mg in the evening.      Multiple Vitamin (MULTIVITAMIN ADULT PO) Take by mouth      predniSONE 20 mg tablet PLEASE SEE ATTACHED FOR DETAILED DIRECTIONS      ranolazine (RANEXA) 1000 MG SR tablet Take 1 tablet by mouth in the morning and 1 tablet in the evening.       Repatha SureClick 140 MG/ML SOAJ INJECT 140MG SUBCUTANEOUSLY EVERY 2 WEEKS      traMADol (ULTRAM) 50 mg tablet 1 tablet as needed      valsartan (DIOVAN) 40 mg tablet every 24 hours       No current facility-administered medications for this visit.        Allergies   Allergen Reactions    Pravastatin Other (See Comments)    Atorvastatin Other (See Comments)    Other Other (See Comments)    Pollen Extract Other (See Comments)    Lovastatin Other (See Comments)    Simvastatin Other (See Comments)    Statins Other (See Comments)     Other reaction(s): Liver enzymes abnormal         The following portions of the patient's history were reviewed and updated as appropriate: allergies, current medications, past family history, past medical history, past social history, past surgical history, and problem list.    OBJECTIVE:     Mental Status Examination:    Appearance calm and cooperative , adequate hygiene and grooming, and good eye contact    Mood euthymic   Affect affect appropriate    Speech a normal rate   Thought Processes normal thought processes   Hallucinations no hallucinations present    Thought Content no delusions   Abnormal Thoughts no suicidal thoughts  and no homicidal thoughts    Orientation  oriented to person and place and time   Remote Memory short term memory intact and long term memory intact   Attention Span concentration intact   Intellect Appears to be of Average Intelligence   Insight Insight intact   Judgement judgment was intact   Muscle Strength Muscle strength and tone were normal   Language no difficulty naming common objects and no difficulty repeating a phrase    Fund of Knowledge displays adequate knowledge of current events   Pain moderate to severe   Pain Scale 3       Laboratory Results: No results found for this or any previous visit.    Assessment/Plan:       Diagnoses and all orders for this visit:    Major depressive disorder, recurrent episode, moderate (HCC)    Generalized anxiety  disorder    Anxiety hyperventilation    Other orders  -     albuterol (PROVENTIL HFA,VENTOLIN HFA) 90 mcg/act inhaler; TAKE 2 PUFFS BY MOUTH EVERY 4 TO 6 HOURS AS NEEDED          Treatment Recommendations- Risks Benefits      Immediate Medical/Psychiatric/Psychotherapy Treatments and Any Precautions: Continue with treatment plan call after talking to cardiologist    Risks, Benefits And Possible Side Effects Of Medications:  {PSYCH RISK, BENEFITS AND POSSIBLE SIDE EFFECTS (Optional):57392    Controlled Medication Discussion: He is aware of safe use and storage of medication     Psychotherapy Provided: 30 minutes  Supportive therapy  Medication evaluation  Mood evaluation  Medication education      Goals discussed in session: Maintain stable mood increase libido    Treatment Plan:    Completed and signed during the session: Not applicable - Treatment Plan not due at this session        Molly Bob, PhD 04/09/24

## 2024-05-28 DIAGNOSIS — F33.1 MAJOR DEPRESSIVE DISORDER, RECURRENT EPISODE, MODERATE (HCC): Chronic | ICD-10-CM

## 2024-05-28 RX ORDER — ESCITALOPRAM OXALATE 10 MG/1
10 TABLET ORAL DAILY
Qty: 90 TABLET | Refills: 0 | Status: SHIPPED | OUTPATIENT
Start: 2024-05-28

## 2024-05-28 NOTE — TELEPHONE ENCOUNTER
Medication Refill Request     Name of Medication Lexapro 10 mg  Dose/Frequency 1qd  Quantity 90  Verified pharmacy   [x]  Verified ordering Provider   [x]  Does patient have enough for the next 3 days? Yes [] No [x]  Does patient have a follow-up appointment scheduled? Yes [] No [x]   If so when is appointment: will call back to make appointment due to going out of town.

## 2024-11-29 DIAGNOSIS — F33.1 MAJOR DEPRESSIVE DISORDER, RECURRENT EPISODE, MODERATE (HCC): Chronic | ICD-10-CM

## 2024-11-29 RX ORDER — ESCITALOPRAM OXALATE 10 MG/1
10 TABLET ORAL DAILY
Qty: 30 TABLET | Refills: 0 | Status: SHIPPED | OUTPATIENT
Start: 2024-11-29

## 2024-11-29 NOTE — TELEPHONE ENCOUNTER
Medication Refill Request     Name of Medication LEXAPRO  Dose/Frequency 10 mg  Quantity 90  Verified pharmacy   [x]  Verified ordering Provider   [x]  Does patient have enough for the next 3 days? Yes [x] No []  Does patient have a follow-up appointment scheduled? Yes [x] No []   If so when is appointment: 12/5 @8:45

## 2024-11-29 NOTE — TELEPHONE ENCOUNTER
Patient contacted the office to schedule a follow up visit with provider. Patient is now scheduled for 12/5  at 8:45 virtually.

## 2024-12-05 ENCOUNTER — TELEMEDICINE (OUTPATIENT)
Dept: PSYCHIATRY | Facility: CLINIC | Age: 74
End: 2024-12-05
Payer: MEDICARE

## 2024-12-05 DIAGNOSIS — F41.1 GENERALIZED ANXIETY DISORDER: ICD-10-CM

## 2024-12-05 DIAGNOSIS — F33.1 MAJOR DEPRESSIVE DISORDER, RECURRENT EPISODE, MODERATE (HCC): Primary | Chronic | ICD-10-CM

## 2024-12-05 PROCEDURE — 90833 PSYTX W PT W E/M 30 MIN: CPT | Performed by: NURSE PRACTITIONER

## 2024-12-05 PROCEDURE — 99214 OFFICE O/P EST MOD 30 MIN: CPT | Performed by: NURSE PRACTITIONER

## 2024-12-10 PROBLEM — F45.8 ANXIETY HYPERVENTILATION: Chronic | Status: RESOLVED | Noted: 2020-07-09 | Resolved: 2024-12-10

## 2024-12-10 PROBLEM — F41.9 ANXIETY HYPERVENTILATION: Chronic | Status: RESOLVED | Noted: 2020-07-09 | Resolved: 2024-12-10

## 2024-12-10 RX ORDER — INCLISIRAN 284 MG/1.5ML
INJECTION, SOLUTION SUBCUTANEOUS
COMMUNITY
Start: 2024-11-14

## 2024-12-10 RX ORDER — METHYLPREDNISOLONE 4 MG
TABLET, DOSE PACK ORAL
COMMUNITY
Start: 2024-10-28

## 2024-12-10 RX ORDER — VIT A/VIT C/VIT E/ZINC/COPPER 4296-226
CAPSULE ORAL
COMMUNITY

## 2024-12-10 RX ORDER — PREDNISONE 10 MG/1
TABLET ORAL
COMMUNITY
Start: 2024-11-25

## 2024-12-10 RX ORDER — ASPIRIN 81 MG/1
TABLET ORAL EVERY 24 HOURS
COMMUNITY

## 2024-12-10 RX ORDER — MELOXICAM 15 MG/1
TABLET ORAL
COMMUNITY

## 2024-12-10 NOTE — BH TREATMENT PLAN
TREATMENT PLAN (Medication Management Only)         [facility]     Name and Date of Birth:  Chepe Cuevas 74 y.o. 1950  Date of Treatment Plan: August 22, 2020, updated December 30, 2020, December 06, 2021, May 18, 2022, October 17, 2022, April 17, 2023, February 14, 2024, December 5, 2024  Diagnosis/Diagnoses:    1. Major depressive disorder, recurrent episode, moderate (HCC)    2. Anxiety hyperventilation       Strengths/Personal Resources for Self-Care: supportive family, taking medications as prescribed, ability to communicate needs, average or above intelligence.  Area/Areas of need (in own words): anxiety symptoms  1.         Long Term Goal: improve control of anxiety.  Target Date: 6 months -6/5/2025  Person/Persons responsible for completion of goal: Chepe stone, provider  2.         Short Term Objective (s) - How will we reach this goal?:   A. Provider new recommended medication/dosage changes and/or continue medication(s): continue current medications as prescribed.  B.  Self care  C. grounding techniques.  Target Date:  6 months -6/5/2025  Person/Persons Responsible for Completion of Goal: Chepe stone, provider  Progress Towards Goals: continuing treatment  Treatment Modality: medication management every 3 months  Review due 180 days from date of this plan:  6 months -6/5/2025     Expected length of service: ongoing treatment  My Physician/PA/NP and I have developed this plan together and I agree to work on the goals and objectives. I understand the treatment goals that were developed for my treatment.

## 2024-12-10 NOTE — PSYCH
Virtual Regular Visit    Problem List Items Addressed This Visit          Behavioral Health    Major depressive disorder, recurrent episode, moderate (HCC) - Primary (Chronic)    Lexapro 10 mg daily    Chepe reports he is happy denies depression or suicidal ideation he stopped therapy his appetite is good and he is sleeping well.  He retired and he has been golfing several times a week.  Reports Lexapro is effective in helping his mood.  Takes medication as prescribed and family are supportive         Generalized anxiety disorder    Klonopin 0.5 mg twice daily as needed for anxiety    Chepe reports rare use of Klonopin for anxiety and denies panic attacks.  Appetite and sleep patterns are good he is social.  One of his good friends passed and he is coping.  Wife is supportive          Reason for visit is   Chief Complaint   Patient presents with    Anxiety    Depression    Medication Management     Encounter provider Molly Bob, PhD    Provider located at [address]  [address]  [facility] [address] [phone]    Recent Visits  Date Type Provider Dept   12/05/24 Telemedicine Molly Bob, PhD  Psychiatric [facility]   Showing recent visits within past 7 days and meeting all other requirements  Future Appointments  No visits were found meeting these conditions.  Showing future appointments within next 150 days and meeting all other requirements       After connecting through [employer], the patient was identified by name and date of birth. Chepe Cuevas was informed that this is a telemedicine visit and that the visit is being conducted through the Epic Embedded platform. He agrees to proceed. which may not be secure and therefore, might not be HIPAA-compliant.  My office door was closed. No one else was in the room.  He acknowledged consent and understanding of privacy and security of the video platform. The patient  has agreed to participate and understands they can discontinue the visit at any time.    SUBJECTIVE:    Chepe Cuevas is a 74 y.o. male with a history of anxiety and depression seen for medication management and mood assessment.  DOMINIC-7 score of 3 indicates no anxiety, PHQ-9 score of 3 indicates no depression or suicidal ideation    HPI ROS Appetite Changes and Sleep: normal appetite and normal energy level    Review Of Systems:     Mood Anxiety and Depression   Behavior Normal    Thought Content Normal   General Emotional Problems   Personality Normal   Other Psych Symptoms Normal   Constitutional As Noted in HPI   ENT As Noted in HPI   Cardiovascular As Noted in HPI   Respiratory As Noted in HPI   Gastrointestinal As Noted in HPI   Genitourinary As Noted in HPI   Musculoskeletal As Noted in HPI   Integumentary As Noted in HPI   Neurological As Noted in HPI   Endocrine Normal    Other Symptoms Normal        Substance Abuse History:    Social History     Substance and Sexual Activity   Drug Use Never       Family Psychiatric History:     Family History   Problem Relation Age of Onset    Depression Mother     Post-traumatic stress disorder Son        Social History     Socioeconomic History    Marital status: /Civil Union     Spouse name: Not on file    Number of children: Not on file    Years of education: Not on file    Highest education level: Not on file   Occupational History    Not on file   Tobacco Use    Smoking status: Never    Smokeless tobacco: Never   Vaping Use    Vaping status: Not on file   Substance and Sexual Activity    Alcohol use: Yes    Drug use: Never    Sexual activity: Not Currently     Partners: Female   Other Topics Concern    Not on file   Social History Narrative    Not on file     Social Drivers of Health     Financial Resource Strain: Low Risk  (12/13/2021)    [facility]    Overall Financial Resource Strain (CARDIA)     Difficulty of Paying  Living Expenses: Not hard at all   Food Insecurity: Unknown (12/13/2021)    Received from [facility]    Hunger Vital Sign     Worried About Running Out of Food in the Last Year: Never true     Ran Out of Food in the Last Year: Not on file   Transportation Needs: Unknown (12/13/2021)    Received from [facility]    PRAPARE - Transportation     Lack of Transportation (Medical): No     Lack of Transportation (Non-Medical): Not on file   Physical Activity: Unknown (12/13/2021)    Received from [facility]    Exercise Vital Sign     Days of Exercise per Week: 3 days     Minutes of Exercise per Session: Not on file   Stress: Not on file   Social Connections: Unknown (12/13/2021)    Received from [facility]    Social Connection and Isolation Panel [NHANES]     Frequency of Communication with Friends and Family: More than three times a week     Frequency of Social Gatherings with Friends and Family: Not on file     Attends [employer] Services: Not on file     Active Member of Clubs or Organizations: Not on file     Attends Club or Organization Meetings: Not on file     Marital Status: Not on file   Intimate Partner Violence: Unknown (12/13/2021)    Received from [facility]    Humiliation, Afraid, Rape, and Kick questionnaire     Fear of Current or Ex-Partner: No     Emotionally Abused: Not on file     Physically Abused: Not on file     Sexually Abused: Not on file   Housing Stability: Unknown (12/13/2021)    Received from [facility]    Housing Stability Vital Sign     Unable to Pay for Housing in the Last Year: No     Number of Places Lived in the Last Year: Not on file     Unstable Housing in the Last Year: Not on file       Past Medical History:   Diagnosis Date    Anxiety     Depression     Frequent urination     Hypercholesterolemia     Hypertension     Rheumatoid arthritis (HCC)     Rib  fracture     Rupture quadriceps tendon     Seasonal allergies     Sinusitis     Sleep difficulties     Tenosynovitis     bilateral stenosing    Umbilical hernia     Wears glasses        Past Surgical History:   Procedure Laterality Date    HAND SURGERY Bilateral     QUADRICEPS REPAIR  2015    SINUS SURGERY  1982    TONSILLECTOMY  1982    UMBILICAL HERNIA REPAIR  2014       Current Outpatient Medications   Medication Sig Dispense Refill    albuterol (PROVENTIL HFA,VENTOLIN HFA) 90 mcg/act inhaler TAKE 2 PUFFS BY MOUTH EVERY 4 TO 6 HOURS AS NEEDED      amLODIPine (NORVASC) 5 mg tablet Take 5 mg by mouth 2 (two) times a day      Ciclopirox 1 % shampoo       clonazePAM (KlonoPIN) 0.5 mg tablet Take 1 tablet (0.5 mg total) by mouth 2 (two) times a day as needed for anxiety 60 tablet 0    cycloSPORINE (RESTASIS) 0.05 % ophthalmic emulsion 1 drop  in the morning and 1 drop in the evening.      econazole nitrate 1 % cream       escitalopram (LEXAPRO) 10 mg tablet Take 1 tablet (10 mg total) by mouth daily 30 tablet 0    ezetimibe (ZETIA) 10 mg tablet Take 10 mg by mouth in the morning.      fluticasone (FLONASE) 50 mcg/act nasal spray       HYDROcodone Bit-Homatrop MBr (HYCODAN) 5-1.5 mg/5 mL syrup TAKE 5 MILLILITER BY ORAL ROUTE EVERY 4 - 6 HOURS AS NEEDED AS NEEDED      Inclisiran Sodium (Leqvio) subcutaneous injection as directed Subcutaneous every 6 months      ketoconazole (NIZORAL) 2 % shampoo       Livalo 4 MG TABS       Medrol 4 MG tablet therapy pack 1 tablet with food as directed Orally as directed      metoprolol succinate (TOPROL-XL) 25 mg 24 hr tablet Take 25 mg by mouth in the morning and 25 mg in the evening.      Multiple Vitamin (MULTIVITAMIN ADULT PO) Take by mouth      predniSONE 10 mg tablet       predniSONE 20 mg tablet PLEASE SEE ATTACHED FOR DETAILED DIRECTIONS      ranolazine (RANEXA) 1000 MG SR tablet Take 1 tablet by mouth in the morning and 1 tablet in the evening.      Singulair 10 MG tablet  every 24 hours      traMADol (ULTRAM) 50 mg tablet 1 tablet as needed      valsartan (DIOVAN) 40 mg tablet every 24 hours      aspirin (ECOTRIN LOW STRENGTH) 81 mg EC tablet every 24 hours      meloxicam (MOBIC) 15 mg tablet 1 tablet Orally q week      Multiple Vitamins-Minerals (PreserVision AREDS) CAPS as directed Orally      Repatha SureClick 140 MG/ML SOAJ INJECT 140MG SUBCUTANEOUSLY EVERY 2 WEEKS       No current facility-administered medications for this visit.        Allergies   Allergen Reactions    Pravastatin Other (See Comments)    Atorvastatin Other (See Comments)    Other Other (See Comments)    Pollen Extract Other (See Comments)    Lovastatin Other (See Comments)    Simvastatin Other (See Comments)    Statins Other (See Comments)     Other reaction(s): Liver enzymes abnormal         The following portions of the patient's history were reviewed and updated as appropriate: allergies, current medications, past family history, past medical history, past social history, past surgical history, and problem list.    OBJECTIVE:     Mental Status Examination:    Appearance calm and cooperative , adequate hygiene and grooming, and good eye contact    Mood mood appropriate   Affect affect appropriate    Speech a normal rate   Thought Processes normal thought processes   Hallucinations no hallucinations present    Thought Content no delusions   Abnormal Thoughts no suicidal thoughts  and no homicidal thoughts    Orientation  oriented to person and place and time   Remote Memory short term memory intact and long term memory intact   Attention Span concentration intact   Intellect Appears to be of Average Intelligence   Insight Insight intact   Judgement judgment was intact   Muscle Strength Muscle strength and tone were normal   Language no difficulty naming common objects   Fund of Knowledge displays adequate knowledge of current events   Pain none   Pain Scale 0       Laboratory Results: No results found for this  "or any previous visit.    Assessment/Plan:       Diagnoses and all orders for this visit:    Major depressive disorder, recurrent episode, moderate (HCC)    Generalized anxiety disorder    Other orders  -     Multiple Vitamins-Minerals (PreserVision AREDS) CAPS; as directed Orally  -     predniSONE 10 mg tablet  -     Medrol 4 MG tablet therapy pack; 1 tablet with food as directed Orally as directed  -     Inclisiran Sodium (Leqvio) subcutaneous injection; as directed Subcutaneous every 6 months  -     Singulair 10 MG tablet; every 24 hours  -     aspirin (ECOTRIN LOW STRENGTH) 81 mg EC tablet; every 24 hours  -     meloxicam (MOBIC) 15 mg tablet; 1 tablet Orally q week          Treatment Recommendations- Risks Benefits      Immediate Medical/Psychiatric/Psychotherapy Treatments and Any Precautions: Continue treatment plan    Risks, Benefits And Possible Side Effects Of Medications:  {PSYCH RISK, BENEFITS AND POSSIBLE SIDE EFFECTS (Optional):04160    Controlled Medication Discussion: Aware of safe use and storage of medication    Psychotherapy Provided: 30 minutes  Supportive therapy  Medication evaluation  Mood assessment  Surveys reviewed and confirmed  Normalized and validated his feelings  Safety plan not compiled; however, he is aware of who to call in crisis, 988 and ED if necessary  Goals discussed in session: Maintain stable mood     Treatment Plan:    Completed and signed during the session: Yes - Treatment Plan done but not signed at time of office visit due to:  Plan reviewed by video and verbal consent given due to virtual visit. Treatment Plan sent to patient via [employer] for signature.    I spent 30 minutes minutes with the patient during this visit.  \"Portions of the record may have been created with voice recognition software. Occasional wrong word or \"sound a like\" substitutions may have occurred due to the inherent limitations of voice recognition software. Read the chart carefully and recognize, " "using context, where substitutions have occurred. Please call if you have any questions. \"    Molly Bob, PhD 12/10/24  "

## 2024-12-10 NOTE — ASSESSMENT & PLAN NOTE
Lexapro 10 mg daily    Chepe reports he is happy denies depression or suicidal ideation he stopped therapy his appetite is good and he is sleeping well.  He retired and he has been golfing several times a week.  Reports Lexapro is effective in helping his mood.  Takes medication as prescribed and family are supportive PHQ-9 score of 3 indicates no depression or suicidal ideation.

## 2024-12-10 NOTE — ASSESSMENT & PLAN NOTE
Klonopin 0.5 mg twice daily as needed for anxiety    Chepe reports rare use of Klonopin for anxiety and denies panic attacks.  Appetite and sleep patterns are good he is social.  One of his good friends passed and he is coping.  Wife is supportive.  DOMINIC-7 score 3 indicates no anxiety

## 2024-12-30 DIAGNOSIS — F33.1 MAJOR DEPRESSIVE DISORDER, RECURRENT EPISODE, MODERATE (HCC): Chronic | ICD-10-CM

## 2024-12-30 RX ORDER — ESCITALOPRAM OXALATE 10 MG/1
10 TABLET ORAL DAILY
Qty: 30 TABLET | Refills: 3 | Status: SHIPPED | OUTPATIENT
Start: 2024-12-30

## 2024-12-30 NOTE — TELEPHONE ENCOUNTER
Reason for call:   [x] Refill   [] Prior Auth  [x] Other: Patient requesting 90 day supply    Office:   [] PCP/Provider -   [x] Specialty/Provider - [facility] PA-C     Medication:  escitalopram (LEXAPRO) 10 mg tablet    Dose/Frequency: Take 1 tablet (10 mg total) by mouth daily     Quantity: 30 tablet    Pharmacy: [facility] and [address]  [phone]    Does the patient have enough for 3 days?   [x] Yes   [] No - Send as HP to POD

## 2025-01-16 DIAGNOSIS — F33.1 MAJOR DEPRESSIVE DISORDER, RECURRENT EPISODE, MODERATE (HCC): Chronic | ICD-10-CM

## 2025-01-16 NOTE — TELEPHONE ENCOUNTER
Not a duplicate  Pharmacy change    Reason for call:   [x] Refill   [] Prior Auth  [] Other:     Office:   [] PCP/Provider -   [x] Specialty/Provider - Psych    Medication: escitalopram (LEXAPRO) 10 mg tablet     Dose/Frequency: Take 1 tablet (10 mg total) by mouth daily     Quantity: 30    Pharmacy: [facility]/pharmacy #[id number] [address]     Does the patient have enough for 3 days?   [x] Yes   [] No - Send as HP to POD

## 2025-01-20 RX ORDER — ESCITALOPRAM OXALATE 10 MG/1
10 TABLET ORAL DAILY
Qty: 30 TABLET | Refills: 0 | Status: SHIPPED | OUTPATIENT
Start: 2025-01-20

## 2025-03-17 ENCOUNTER — TELEPHONE (OUTPATIENT)
Age: 75
End: 2025-03-17

## 2025-03-17 DIAGNOSIS — F33.1 MAJOR DEPRESSIVE DISORDER, RECURRENT EPISODE, MODERATE (HCC): Chronic | ICD-10-CM

## 2025-03-17 RX ORDER — ESCITALOPRAM OXALATE 10 MG/1
10 TABLET ORAL DAILY
Qty: 30 TABLET | Refills: 0 | OUTPATIENT
Start: 2025-03-17

## 2025-03-17 RX ORDER — ESCITALOPRAM OXALATE 10 MG/1
10 TABLET ORAL DAILY
Qty: 90 TABLET | Refills: 0 | Status: SHIPPED | OUTPATIENT
Start: 2025-03-17 | End: 2025-03-18

## 2025-03-17 NOTE — TELEPHONE ENCOUNTER
Medication Refill Request     Name of Medication escitalopram (LEXAPRO)   Dose/Frequency 10mg  Quantity 30  Verified pharmacy   [x]  Verified ordering Provider   [x]  Does patient have enough for the next 3 days? Yes [x] No []  Does patient have a follow-up appointment scheduled? Yes [x] No []   If so when is appointment: n/a   Abdomen soft, non-tender, no guarding.

## 2025-03-17 NOTE — TELEPHONE ENCOUNTER
Patient called in to get a refill for medication Lexapro 10mg.     Patient shared he is out of stated and would like to get 90 day supply instead of 30 day supply.     Writer informed patient msg will be relay to provider.

## 2025-03-18 RX ORDER — ESCITALOPRAM OXALATE 10 MG/1
10 TABLET ORAL DAILY
Qty: 30 TABLET | Refills: 3 | Status: SHIPPED | OUTPATIENT
Start: 2025-03-18